# Patient Record
Sex: FEMALE | Race: BLACK OR AFRICAN AMERICAN | NOT HISPANIC OR LATINO | Employment: PART TIME
[De-identification: names, ages, dates, MRNs, and addresses within clinical notes are randomized per-mention and may not be internally consistent; named-entity substitution may affect disease eponyms.]

---

## 2017-05-05 DIAGNOSIS — R52 BODY ACHES: ICD-10-CM

## 2017-05-05 DIAGNOSIS — M72.2 PLANTAR FASCIITIS: ICD-10-CM

## 2017-05-05 RX ORDER — NAPROXEN 500 MG/1
500 TABLET ORAL 2 TIMES DAILY PRN
Qty: 30 TABLET | Refills: 0 | Status: SHIPPED | OUTPATIENT
Start: 2017-05-05 | End: 2022-05-26

## 2017-05-05 NOTE — TELEPHONE ENCOUNTER
naproxen (NAPROSYN) 500 MG tablet     Last Written Prescription Date: 10-6-2016  Last Quantity: 30, # refills: 0  Last Office Visit with G, P or [facility] prescribing provider: 9-       Creatinine   Date Value Ref Range Status   10/06/2016 0.61 0.52 - 1.04 mg/dL Final     Lab Results   Component Value Date    AST 27 10/06/2016     Lab Results   Component Value Date    ALT 35 10/06/2016     BP Readings from Last 3 Encounters:   10/14/16 104/70   10/06/16 116/67   09/28/16 109/78

## 2017-05-05 NOTE — TELEPHONE ENCOUNTER
Prescription approved per [facility], [facility] or [facility] refill protocol.  Kathi Titus RN  [facility] [employer]

## 2017-07-15 ENCOUNTER — HEALTH MAINTENANCE LETTER (OUTPATIENT)
Age: 42
End: 2017-07-15

## 2018-01-23 ENCOUNTER — OFFICE VISIT (OUTPATIENT)
Dept: FAMILY MEDICINE | Facility: CLINIC | Age: 43
End: 2018-01-23
Payer: MEDICAID

## 2018-01-23 VITALS
HEIGHT: 65 IN | TEMPERATURE: 98.4 F | OXYGEN SATURATION: 100 % | WEIGHT: 193 LBS | BODY MASS INDEX: 32.15 KG/M2 | DIASTOLIC BLOOD PRESSURE: 75 MMHG | SYSTOLIC BLOOD PRESSURE: 102 MMHG | HEART RATE: 64 BPM

## 2018-01-23 DIAGNOSIS — H01.004 BLEPHARITIS OF LEFT UPPER EYELID, UNSPECIFIED TYPE: ICD-10-CM

## 2018-01-23 DIAGNOSIS — K21.9 GASTROESOPHAGEAL REFLUX DISEASE, ESOPHAGITIS PRESENCE NOT SPECIFIED: ICD-10-CM

## 2018-01-23 DIAGNOSIS — H10.32 ACUTE CONJUNCTIVITIS OF LEFT EYE, UNSPECIFIED ACUTE CONJUNCTIVITIS TYPE: Primary | ICD-10-CM

## 2018-01-23 DIAGNOSIS — M54.2 CERVICALGIA: ICD-10-CM

## 2018-01-23 PROCEDURE — 99214 OFFICE O/P EST MOD 30 MIN: CPT | Performed by: FAMILY MEDICINE

## 2018-01-23 RX ORDER — TRAMADOL HYDROCHLORIDE 50 MG/1
50 TABLET ORAL 2 TIMES DAILY PRN
Qty: 30 TABLET | Refills: 0 | Status: SHIPPED | OUTPATIENT
Start: 2018-01-23 | End: 2022-05-26

## 2018-01-23 RX ORDER — SULFAMETHOXAZOLE/TRIMETHOPRIM 800-160 MG
1 TABLET ORAL 2 TIMES DAILY
Qty: 20 TABLET | Refills: 0 | Status: SHIPPED | OUTPATIENT
Start: 2018-01-23 | End: 2018-03-21

## 2018-01-23 RX ORDER — SUCRALFATE ORAL 1 G/10ML
1 SUSPENSION ORAL 4 TIMES DAILY
Qty: 420 ML | Refills: 1 | Status: SHIPPED | OUTPATIENT
Start: 2018-01-23 | End: 2022-05-26

## 2018-01-23 NOTE — PROGRESS NOTES
SUBJECTIVE:   Isabel aMthias is a 43 year old female who presents to clinic today for the following health issues:      Eye(s) Problem      Duration: x 3 days     Description:  Location: left  Pain: YES, worse toward inside the eye , so I will it feels like slightly swollen and puffy and red  Swelling:YES,   Redness: YES  Discharge: YES, mattery .  Denies any problem with the vision.  No sinus URI symptoms otherwise    Accompanying signs and symptoms: Watery, itching     History (Trauma, foreign body exposure,): None    Precipitating or alleviating factors (contact use): None    Therapies tried and outcome: None        PROBLEMS TO ADD ON...  She also wants refill on her Ultram Carafate.     Ultram works for her neck, she tries to be cautious with the use.  Pain is aggravated recently somewhat like a refill on Ultram   Also has history of GERD doing well.    Problem list and histories reviewed & adjusted, as indicated.  Additional history: as documented    Patient Active Problem List   Diagnosis     Esophageal reflux     Vitamin D deficiency     Hyperlipidemia LDL goal <130     Advanced directives, counseling/discussion     Pain in joint, ankle and foot     Constipation     H. pylori infection     Sleep-related bruxism     Low back pain     Plantar fasciitis     Anxiety state     Iron deficiency anemia     BMI 34.0-34.9,adult     Past Surgical History:   Procedure Laterality Date     NO HISTORY OF SURGERY         Social History   Substance Use Topics     Smoking status: Never Smoker     Smokeless tobacco: Never Used     Alcohol use No     Family History   Problem Relation Age of Onset     DIABETES Mother      type 2     DIABETES Sister      type 2     DIABETES Maternal Aunt      type 2     Hypertension Mother      Lipids Mother              Reviewed and updated as needed this visit by clinical staff     Reviewed and updated as needed this visit by Provider         ROS:  Constitutional, HEENT, cardiovascular,  "pulmonary, GI, , musculoskeletal, neuro, skin, endocrine and psych systems are negative, except as otherwise noted.      OBJECTIVE:   /75  Pulse 64  Temp 98.4  F (36.9  C) (Tympanic)  Ht 5' 4.8\" (1.646 m)  Wt 193 lb (87.5 kg)  SpO2 100%  BMI 32.32 kg/m2  Body mass index is 32.32 kg/(m^2).  GENERAL: healthy, alert and no distress  EYES: rt Eyes grossly normal to inspection, PERRL, EOMI, left eye, conjunctivae with mild conjunctival injection , her upper eyelid looks slightly puffy and erythematous eyelids.  Minimal discharge at this time  HENT: ear canals and TM's normal, nose and mouth without ulcers or lesions, no sinus tenderness  NECK: no adenopathy  MS, neck with mild  tenderness paracervical muscles , more so on the right with slight decrease ROM of neck .  RESP: lungs clear to auscultation - no rales, rhonchi or wheezes  CV: regular rate and rhythm, normal S1 S2, no S3 or S4,   Abdomen soft nontender            ASSESSMENT/PLAN:           (H10.32) Acute conjunctivitis of left eye, unspecified acute conjunctivitis type  Comment:   Plan: sulfamethoxazole-trimethoprim (BACTRIM         DS/SEPTRA DS) 800-160 MG per tablet            (H01.004) Blepharitis of left upper eyelid, unspecified type  Comment:   Plan: sulfamethoxazole-trimethoprim (BACTRIM         DS/SEPTRA DS) 800-160 MG per tablet        Cares and symptomatic treatment discussed follow up if problem         (K21.9) Gastroesophageal reflux disease, esophagitis presence not specified  (primary encounter diagnosis)  Comment: Stable  Plan: sucralfate (CARAFATE) 1 GM/10ML suspension            (M54.2) Cervicalgia  Comment: Recurrence of symptoms recently  Plan: traMADol (ULTRAM) 50 MG tablet  Refill given.  Neck cares and symptomatic treatment discussed. follow-up as needed    Patient expressed understanding and agreement with treatment plan. All patient's questions were answered, will let me know if has more later.  Medications: Rx's: Reviewed " the potential side effects/complications of medications prescribed.         Daly Cummins MD  [facility] [place]

## 2018-01-23 NOTE — MR AVS SNAPSHOT
"              After Visit Summary   1/23/2018    Isabel Mathias    MRN: [id number]           Patient Information     Date Of Birth          1975        Visit Information        Provider Department      1/23/2018 2:15 PM Daly Cummins MD; [facility] [place]        Today's Diagnoses     Acute conjunctivitis of left eye, unspecified acute conjunctivitis type    -  1    Cervicalgia        Gastroesophageal reflux disease, esophagitis presence not specified        Blepharitis of left upper eyelid, unspecified type          Care Instructions    Take medications as directed.  Treatment  and symptomatic cares discussed   Follow up if problem or concern             Follow-ups after your visit        Follow-up notes from your care team     Return if symptoms worsen or fail to improve.      Who to contact     If you have questions or need follow up information about today's clinic visit or your schedule please contact [facility] [place] directly at [phone].  Normal or non-critical lab and imaging results will be communicated to you by MyChart, letter or phone within 4 business days after the clinic has received the results. If you do not hear from us within 7 days, please contact the clinic through MyChart or phone. If you have a critical or abnormal lab result, we will notify you by phone as soon as possible.  Submit refill requests through [employer] or call your pharmacy and they will forward the refill request to us. Please allow 3 business days for your refill to be completed.          Additional Information About Your Visit        MyChart Information     [employer] lets you send messages to your doctor, view your test results, renew your prescriptions, schedule appointments and more. To sign up, go to [email or web address] . Click on \"Log in\" on the left side of the screen, which will take you to the Welcome page. Then click on \"Sign up Now\" on the right side of the " "page.     You will be asked to enter the access code listed below, as well as some personal information. Please follow the directions to create your username and password.     Your access code is: [email or web address]  Expires: 2018  3:48 PM     Your access code will  in 90 days. If you need help or a new code, please call your [place] clinic or [phone].        Care EveryWhere ID     This is your Care EveryWhere ID. This could be used by other organizations to access your [place] medical records  [id number]        Your Vitals Were     Pulse Temperature Height Pulse Oximetry BMI (Body Mass Index)       64 98.4  F (36.9  C) (Tympanic) 5' 4.8\" (1.646 m) 100% 32.32 kg/m2        Blood Pressure from Last 3 Encounters:   18 102/75   10/14/16 104/70   10/06/16 116/67    Weight from Last 3 Encounters:   18 193 lb (87.5 kg)   10/14/16 192 lb 9.6 oz (87.4 kg)   10/06/16 192 lb (87.1 kg)              Today, you had the following     No orders found for display         Today's Medication Changes          These changes are accurate as of: 18  3:48 PM.  If you have any questions, ask your nurse or doctor.               Start taking these medicines.        Dose/Directions    sulfamethoxazole-trimethoprim 800-160 MG per tablet   Commonly known as:  BACTRIM DS/SEPTRA DS   Used for:  Acute conjunctivitis of left eye, unspecified acute conjunctivitis type, Blepharitis of left upper eyelid, unspecified type   Started by:  Daly Cummins MD        Dose:  1 tablet   Take 1 tablet by mouth 2 times daily   Quantity:  20 tablet   Refills:  0            Where to get your medicines      These medications were sent to [facility] [id number] [facility] - [place] - [address]  [address] [facility] [address]     Phone:  [phone]     sucralfate 1 GM/10ML suspension    sulfamethoxazole-trimethoprim 800-160 MG per tablet         Some of these will need a paper prescription and others can " be bought over the counter.  Ask your nurse if you have questions.     Bring a paper prescription for each of these medications     traMADol 50 MG tablet                Primary Care Provider Office Phone # Fax #    Iraiad Daniels PA-C [phone]       [address] [address]        Equal Access to Services     LEONOR BERNABE : Hadii aad ku hadasho Soomaali, waaxda luqadaha, qaybta kaalmada adeegyada, waxay idiin hayaan adeeg kharash latheresa . So [facility] [phone].    ATENCIÓN: Si habla español, tiene a gonzalez disposición servicios gratuitos de asistencia lingüística. [facility] [phone].    We comply with applicable federal civil rights laws and [place] laws. We do not discriminate on the basis of race, color, national origin, age, disability, sex, sexual orientation, or gender identity.            Thank you!     Thank you for choosing [facility] [place]  for your care. Our goal is always to provide you with excellent care. Hearing back from our patients is one way we can continue to improve our services. Please take a few minutes to complete the written survey that you may receive in the mail after your visit with us. Thank you!             Your Updated Medication List - Protect others around you: Learn how to safely use, store and throw away your medicines at www.disposemymeds.org.          This list is accurate as of: 1/23/18  3:48 PM.  Always use your most recent med list.                   Brand Name Dispense Instructions for use Diagnosis    calcium carbonate 1250 MG tablet    OS-DOLORES 500 mg [place]. [place]    180 tablet    Take 1 tablet (500 mg) by mouth 2 times daily    Vitamin D deficiency       multivitamin, therapeutic with minerals Tabs tablet     100 tablet    Take 1 tablet by mouth daily    Vitamin D deficiency       naproxen 500 MG tablet    NAPROSYN    30 tablet    Take 1 tablet (500 mg) by mouth 2 times daily as needed for moderate pain     Body aches, Plantar fasciitis       order for DME     1 Device    Equipment being ordered: superfeet inserts- size D    Myalgia and myositis, unspecified, Plantar fasciitis       sucralfate 1 GM/10ML suspension    CARAFATE    420 mL    Take 10 mLs (1 g) by mouth 4 times daily    Gastroesophageal reflux disease, esophagitis presence not specified       sulfamethoxazole-trimethoprim 800-160 MG per tablet    BACTRIM DS/SEPTRA DS    20 tablet    Take 1 tablet by mouth 2 times daily    Acute conjunctivitis of left eye, unspecified acute conjunctivitis type, Blepharitis of left upper eyelid, unspecified type       traMADol 50 MG tablet    ULTRAM    30 tablet    Take 1 tablet (50 mg) by mouth 2 times daily as needed for moderate pain    Cervicalgia       * vitamin D 2000 UNITS tablet     100 tablet    Take 2,000 Units by mouth daily    Vitamin D deficiency       * vitamin D 2000 UNITS tablet     100 tablet    Take 2,000 Units by mouth daily    Vitamin D deficiency       * Notice:  This list has 2 medication(s) that are the same as other medications prescribed for you. Read the directions carefully, and ask your doctor or other care provider to review them with you.

## 2018-03-21 ENCOUNTER — OFFICE VISIT (OUTPATIENT)
Dept: FAMILY MEDICINE | Facility: CLINIC | Age: 43
End: 2018-03-21
Payer: MEDICAID

## 2018-03-21 VITALS
RESPIRATION RATE: 20 BRPM | WEIGHT: 194 LBS | HEART RATE: 78 BPM | HEIGHT: 65 IN | BODY MASS INDEX: 32.32 KG/M2 | OXYGEN SATURATION: 99 % | TEMPERATURE: 98.5 F

## 2018-03-21 DIAGNOSIS — K21.9 GASTROESOPHAGEAL REFLUX DISEASE, ESOPHAGITIS PRESENCE NOT SPECIFIED: ICD-10-CM

## 2018-03-21 DIAGNOSIS — M72.2 PLANTAR FASCIITIS: ICD-10-CM

## 2018-03-21 DIAGNOSIS — J30.2 SEASONAL ALLERGIC RHINITIS, UNSPECIFIED CHRONICITY, UNSPECIFIED TRIGGER: ICD-10-CM

## 2018-03-21 DIAGNOSIS — J01.80 OTHER ACUTE SINUSITIS, RECURRENCE NOT SPECIFIED: Primary | ICD-10-CM

## 2018-03-21 PROCEDURE — 99214 OFFICE O/P EST MOD 30 MIN: CPT | Performed by: FAMILY MEDICINE

## 2018-03-21 PROCEDURE — T1013 SIGN LANG/ORAL INTERPRETER: HCPCS | Mod: U3 | Performed by: FAMILY MEDICINE

## 2018-03-21 RX ORDER — AZITHROMYCIN 250 MG/1
TABLET, FILM COATED ORAL
Qty: 6 TABLET | Refills: 0 | Status: SHIPPED | OUTPATIENT
Start: 2018-03-21 | End: 2022-05-26

## 2018-03-21 RX ORDER — FEXOFENADINE HCL 180 MG/1
180 TABLET ORAL DAILY
Qty: 30 TABLET | Refills: 1 | COMMUNITY
Start: 2018-03-21 | End: 2022-05-26

## 2018-03-21 RX ORDER — NABUMETONE 500 MG/1
500-1000 TABLET, FILM COATED ORAL 2 TIMES DAILY PRN
Qty: 30 TABLET | Refills: 0 | Status: SHIPPED | OUTPATIENT
Start: 2018-03-21 | End: 2022-05-26

## 2018-03-21 NOTE — PROGRESS NOTES
SUBJECTIVE:   Isabel Mathias is a 43 year old female who presents to clinic today for the following health issues:        RESPIRATORY SYMPTOMS      Duration: x Saturday     Description  nasal congestion, rhinorrhea, cough, becoming productive , no  Fever but feels achy chills, headache, fatigue/malaise, myalgias,   nausea and appetite decrease     Severity: severe    Accompanying signs and symptoms: chest congestion/tenderness     History (predisposing factors):  none    Precipitating or alleviating factors: None    Therapies tried and outcome:  nyquil not effective . Also has know allergies, not taking anything for that       Medication Followup of  Tramadol, Sucralfate, Naproxen     Taking Medication as prescribed: yes    Side Effects:  None    Medication Helping Symptoms:  Yes         Also has been  treated for planter fascitis with pain med's, it helps but concerned with ongoing issue. Takes naproxen/tramadol mostly to help with her plantar fasciitis.     GERD  Has know hx of GERD, taking sucralfate mostly still feels irritation in her throat sometime, so feels acid in throat.  No abdominal pain or change in bowel movement.  No nausea vomiting  need refill on medications    Problem list and histories reviewed & adjusted, as indicated.    Additional history: as documented    Patient Active Problem List   Diagnosis     Esophageal reflux     Vitamin D deficiency     Hyperlipidemia LDL goal <130     Advanced directives, counseling/discussion     Pain in joint, ankle and foot     Constipation     H. pylori infection     Sleep-related bruxism     Low back pain     Plantar fasciitis     Anxiety state     Iron deficiency anemia     BMI 34.0-34.9,adult     Past Surgical History:   Procedure Laterality Date     NO HISTORY OF SURGERY         Social History   Substance Use Topics     Smoking status: Never Smoker     Smokeless tobacco: Never Used     Alcohol use No     Family History   Problem Relation Age of Onset  "    DIABETES Mother      type 2     DIABETES Sister      type 2     DIABETES Maternal Aunt      type 2     Hypertension Mother      Lipids Mother            Reviewed and updated as needed this visit by clinical staff       Reviewed and updated as needed this visit by Provider         ROS:  Constitutional, HEENT, cardiovascular, pulmonary, GI, , musculoskeletal, neuro, skin, endocrine and psych systems are negative, except as otherwise noted.    OBJECTIVE:     BP (P) 95/64  Pulse 78  Temp 98.5  F (36.9  C) (Tympanic)  Resp 20  Ht 5' 4.8\" (1.646 m)  Wt 194 lb (88 kg)  SpO2 99%  BMI 32.48 kg/m2  Body mass index is 32.48 kg/(m^2).  GENERAL: healthy, alert and no distress  EYES: Eyes grossly normal to inspection, PERRL and conjunctivae and sclerae normal  HENT: normal cephalic/atraumatic, ear canals and TM's normal and oral mucous membranes moist  NECK: no adenopathy, no asymmetry, masses, or scars and thyroid normal to palpation  RESP: lungs clear to auscultation - no rales, rhonchi or wheezes  CV: regular rate and rhythm, normal S1 S2, no S3 or S4, no murmur, click or rub, no peripheral edema and peripheral pulses strong  ABDOMEN: soft, nontender, no hepatosplenomegaly, no masses and bowel sounds normal  NEURO: Normal strength and tone, mentation intact and speech normal  PSYCH: mentation appears normal, affect normal/bright        ASSESSMENT/PLAN:       (J01.80) Other acute sinusitis, recurrence not specified  (primary encounter diagnosis)  Comment:   Plan: azithromycin (ZITHROMAX) 250 MG tablet            (K21.9) Gastroesophageal reflux disease, esophagitis presence not specified  Comment:   Plan: omeprazole (PRILOSEC) 20 MG CR capsule          M72.2) Plantar fasciitis  Comment:   Plan: PODIATRY/FOOT & ANKLE SURGERY REFERRAL,         nabumetone (RELAFEN) 500 MG tablet          Discussed feet cares. Talked about comfortable shoes, orthotics to support etc. Info given on feet. Also gave Relafen instead of " naprosyn bc of her gi concerned., to help with pain in the meantime.        Pros/ cons of med's discussed follow up if ongoing problem. Gave podiatry referral to further evaluate bc of ongoing sx.  she will follow up as needed       (J30.2) Seasonal allergic rhinitis, unspecified chronicity, unspecified trigger  Comment:   Plan: fexofenadine (ALLEGRA) 180 MG tablet            Patient expressed understanding and agreement with treatment plan. All patient's questions were answered, will let me know if has more later.  Medications: Rx's: Reviewed the potential side effects/complications of medications prescribed.       Daly Cummins MD  [facility]

## 2018-03-21 NOTE — NURSING NOTE
"Chief Complaint   Patient presents with     URI       Initial Pulse 78  Temp 98.5  F (36.9  C) (Tympanic)  Resp 20  Ht 5' 4.8\" (1.646 m)  Wt 194 lb (88 kg)  SpO2 99%  BMI 32.48 kg/m2 Estimated body mass index is 32.48 kg/(m^2) as calculated from the following:    Height as of this encounter: 5' 4.8\" (1.646 m).    Weight as of this encounter: 194 lb (88 kg).  Medication Reconciliation: complete  "

## 2018-03-21 NOTE — PATIENT INSTRUCTIONS
"  GERD (Adult)    The esophagus is a tube that carries food from the mouth to the stomach. A valve at the lower end of the esophagus prevents stomach acid from flowing upward. When this valve doesn't work properly, stomach contents may repeatedly flow back up (reflux) into the esophagus. This is called gastroesophageal reflux disease (GERD). GERD can irritate the esophagus. It can cause problems with swallowing or breathing. In severe cases, GERD can cause recurrent pneumonia or other serious problems.  Symptoms of reflux include burning, pressure or sharp pain in the upper abdomen or mid to lower chest. The pain can spread to the neck, back, or shoulder. There may be belching, an acid taste in the back of the throat, chronic cough, or sore throat or hoarseness. GERD symptoms often occur during the day after a big meal. They can also occur at night when lying down.   Home care  Lifestyle changes can help reduce symptoms. If needed, medicines may be prescribed. Symptoms often improve with treatment, but if treatment is stopped, the symptoms often return after a few months. So most persons with GERD will need to continue treatment.  Lifestyle changes    Limit or avoid fatty, fried, and spicy foods, as well as coffee, chocolate, mint, and foods with high acid content such as tomatoes and citrus fruit and juices (orange, grapefruit, lemon).    Don t eat large meals, especially at night. Frequent, smaller meals are best. Do not lie down right after eating. And don t eat anything 3 hours before going to bed.    Avoid drinking alcohol and smoking. As much as possible, stay away from second hand smoke.    If you are overweight, losing weight will reduce symptoms.     Avoid wearing tight clothing around your stomach area.    If your symptoms occur during sleep, use a foam wedge to elevate your upper body (not just your head.) Or, place 4\" blocks under the head of your bed.  Medicines  If needed, medicines can help relieve " the symptoms of GERD and prevent damage to the esophagus. Discuss a medicine plan with your healthcare provider. This may include one or more of the following medicines:    Antacids to help neutralize the normal acids in your stomach.    Acid blockers (H2 blockers) to decrease acid production.    Acid inhibitors (PPIs) to decrease acid production in a different way than the blockers. They may work better, but can take a little longer to take effect.  Take an antacid 30-60 minutes after eating and at bedtime, but not at the same time as an acid blocker.  Try not to take medicines such as ibuprofen and aspirin. If you are taking aspirin for your heart or other medical reasons, talk to your healthcare provider about stopping it.  Follow-up care  Follow up with your healthcare provider or as advised by our staff.  When to seek medical advice  Call your healthcare provider if any of the following occur:    Stomach pain gets worse or moves to the lower right abdomen (appendix area)    Chest pain appears or gets worse, or spreads to the back, neck, shoulder, or arm    Frequent vomiting (can t keep down liquids)    Blood in the stool or vomit (red or black in color)    Feeling weak or dizzy    Fever of 100.4 F (38 C) or higher, or as directed by your healthcare provider  Date Last Reviewed: 6/23/2015 2000-2017 [employer]. [address] [place] [address], [place] [address]. All rights reserved. This information is not intended as a substitute for professional medical care. Always follow your healthcare professional's instructions.

## 2018-03-21 NOTE — MR AVS SNAPSHOT
After Visit Summary   3/21/2018    Isabel Mathias    MRN: [id number]           Patient Information     Date Of Birth          1975        Visit Information        Provider Department      3/21/2018 1:45 PM Daly Cummins MD; [employer] [facility]        Today's Diagnoses     Other acute sinusitis, recurrence not specified    -  1    Gastroesophageal reflux disease, esophagitis presence not specified        Plantar fasciitis        Seasonal allergic rhinitis, unspecified chronicity, unspecified trigger          Care Instructions      GERD (Adult)    The esophagus is a tube that carries food from the mouth to the stomach. A valve at the lower end of the esophagus prevents stomach acid from flowing upward. When this valve doesn't work properly, stomach contents may repeatedly flow back up (reflux) into the esophagus. This is called gastroesophageal reflux disease (GERD). GERD can irritate the esophagus. It can cause problems with swallowing or breathing. In severe cases, GERD can cause recurrent pneumonia or other serious problems.  Symptoms of reflux include burning, pressure or sharp pain in the upper abdomen or mid to lower chest. The pain can spread to the neck, back, or shoulder. There may be belching, an acid taste in the back of the throat, chronic cough, or sore throat or hoarseness. GERD symptoms often occur during the day after a big meal. They can also occur at night when lying down.   Home care  Lifestyle changes can help reduce symptoms. If needed, medicines may be prescribed. Symptoms often improve with treatment, but if treatment is stopped, the symptoms often return after a few months. So most persons with GERD will need to continue treatment.  Lifestyle changes    Limit or avoid fatty, fried, and spicy foods, as well as coffee, chocolate, mint, and foods with high acid content such as tomatoes and citrus fruit and juices (orange,  "grapefruit, lemon).    Don t eat large meals, especially at night. Frequent, smaller meals are best. Do not lie down right after eating. And don t eat anything 3 hours before going to bed.    Avoid drinking alcohol and smoking. As much as possible, stay away from second hand smoke.    If you are overweight, losing weight will reduce symptoms.     Avoid wearing tight clothing around your stomach area.    If your symptoms occur during sleep, use a foam wedge to elevate your upper body (not just your head.) Or, place 4\" blocks under the head of your bed.  Medicines  If needed, medicines can help relieve the symptoms of GERD and prevent damage to the esophagus. Discuss a medicine plan with your healthcare provider. This may include one or more of the following medicines:    Antacids to help neutralize the normal acids in your stomach.    Acid blockers (H2 blockers) to decrease acid production.    Acid inhibitors (PPIs) to decrease acid production in a different way than the blockers. They may work better, but can take a little longer to take effect.  Take an antacid 30-60 minutes after eating and at bedtime, but not at the same time as an acid blocker.  Try not to take medicines such as ibuprofen and aspirin. If you are taking aspirin for your heart or other medical reasons, talk to your healthcare provider about stopping it.  Follow-up care  Follow up with your healthcare provider or as advised by our staff.  When to seek medical advice  Call your healthcare provider if any of the following occur:    Stomach pain gets worse or moves to the lower right abdomen (appendix area)    Chest pain appears or gets worse, or spreads to the back, neck, shoulder, or arm    Frequent vomiting (can t keep down liquids)    Blood in the stool or vomit (red or black in color)    Feeling weak or dizzy    Fever of 100.4 F (38 C) or higher, or as directed by your healthcare provider  Date Last Reviewed: 6/23/2015 2000-2017 [employer]. [address] [place] [address]. All rights reserved. This information is not intended as a substitute for professional medical care. Always follow your healthcare professional's instructions.                Follow-ups after your visit        Additional Services     PODIATRY/FOOT & ANKLE SURGERY REFERRAL       Your provider has referred you to: [place]: [facility] [phone]   [email or web address]    Please be aware that coverage of these services is subject to the terms and limitations of your health insurance plan.  Call member services at your health plan with any benefit or coverage questions.      Please bring the following to your appointment:  >>   Any x-rays, CTs or MRIs which have been performed.  Contact the facility where they were done to arrange for  prior to your scheduled appointment.    >>   List of current medications   >>   This referral request   >>   Any documents/labs given to you for this referral                  Follow-up notes from your care team     Return in about 8 weeks (around 5/16/2018), or if symptoms worsen or fail to improve.      Who to contact     If you have questions or need follow up information about today's clinic visit or your schedule please contact [facility] [place] directly at [phone].  Normal or non-critical lab and imaging results will be communicated to you by MyChart, letter or phone within 4 business days after the clinic has received the results. If you do not hear from us within 7 days, please contact the clinic through MyChart or phone. If you have a critical or abnormal lab result, we will notify you by phone as soon as possible.  Submit refill requests through [employer] or call your pharmacy and they will forward the refill request to us. Please allow 3 business days for your refill to be completed.          Additional Information About Your Visit        MyChart Information      "[employer] lets you send messages to your doctor, view your test results, renew your prescriptions, schedule appointments and more. To sign up, go to [email or web address] . Click on \"Log in\" on the left side of the screen, which will take you to the Welcome page. Then click on \"Sign up Now\" on the right side of the page.     You will be asked to enter the access code listed below, as well as some personal information. Please follow the directions to create your username and password.     Your access code is: [email or web address]  Expires: 2018  4:48 PM     Your access code will  in 90 days. If you need help or a new code, please call your [place] clinic or [phone].        Care EveryWhere ID     This is your Care EveryWhere ID. This could be used by other organizations to access your [place] medical records  [id number]        Your Vitals Were     Pulse Temperature Respirations Height Pulse Oximetry BMI (Body Mass Index)    78 98.5  F (36.9  C) (Tympanic) 20 5' 4.8\" (1.646 m) 99% 32.48 kg/m2       Blood Pressure from Last 3 Encounters:   18 (P) 95/64   18 102/75   10/14/16 104/70    Weight from Last 3 Encounters:   18 194 lb (88 kg)   18 193 lb (87.5 kg)   10/14/16 192 lb 9.6 oz (87.4 kg)              We Performed the Following     PODIATRY/FOOT & ANKLE SURGERY REFERRAL          Today's Medication Changes          These changes are accurate as of 3/21/18  2:43 PM.  If you have any questions, ask your nurse or doctor.               Start taking these medicines.        Dose/Directions    azithromycin 250 MG tablet   Commonly known as:  ZITHROMAX   Used for:  Other acute sinusitis, recurrence not specified   Started by:  Daly Cummins MD        Two tablets first day, then one tablet daily for four days.   Quantity:  6 tablet   Refills:  0       fexofenadine 180 MG tablet   Commonly known as:  ALLEGRA   Used for:  Seasonal allergic rhinitis, unspecified chronicity, unspecified " trigger   Started by:  Daly Cummins MD        Dose:  180 mg   Take 1 tablet (180 mg) by mouth daily   Quantity:  30 tablet   Refills:  1       nabumetone 500 MG tablet   Commonly known as:  RELAFEN   Used for:  Plantar fasciitis   Started by:  Daly Cummins MD        Dose:  500-1000 mg   Take 1-2 tablets (500-1,000 mg) by mouth 2 times daily as needed for moderate pain   Quantity:  30 tablet   Refills:  0       omeprazole 20 MG CR capsule   Commonly known as:  priLOSEC   Used for:  Gastroesophageal reflux disease, esophagitis presence not specified   Started by:  Daly Cummins MD        Dose:  20 mg   Take 1 capsule (20 mg) by mouth daily   Quantity:  90 capsule   Refills:  1            Where to get your medicines      These medications were sent to [place] [address] [place] [facility] [address]  [address] [facility] [address]     Phone:  [phone]     azithromycin 250 MG tablet    nabumetone 500 MG tablet    omeprazole 20 MG CR capsule         Some of these will need a paper prescription and others can be bought over the counter.  Ask your nurse if you have questions.     You don't need a prescription for these medications     fexofenadine 180 MG tablet                Primary Care Provider Office Phone # Fax #    [facility] [phone]       [address] [facility] [address]        Equal Access to Services     [facility]: Hadii rosio chester hadasho Sobk, waaxda luqadaha, qaybta kaalmada adeegyada, luis hanson. So [facility] [phone].    ATENCIÓN: Si habla español, tiene a gonzalez disposición servicios gratuitos de asistencia lingüística. Llame al [phone].    We comply with applicable federal civil rights laws and [place] laws. We do not discriminate on the basis of race, color, national origin, age, disability, sex, sexual orientation, or gender identity.            Thank you!      Thank you for choosing [facility] [place]  for your care. Our goal is always to provide you with excellent care. Hearing back from our patients is one way we can continue to improve our services. Please take a few minutes to complete the written survey that you may receive in the mail after your visit with us. Thank you!             Your Updated Medication List - Protect others around you: Learn how to safely use, store and throw away your medicines at www.disposemymeds.org.          This list is accurate as of 3/21/18  2:43 PM.  Always use your most recent med list.                   Brand Name Dispense Instructions for use Diagnosis    azithromycin 250 MG tablet    ZITHROMAX    6 tablet    Two tablets first day, then one tablet daily for four days.    Other acute sinusitis, recurrence not specified       calcium carbonate 1250 MG tablet    OS-DOLORES 500 mg [place]    180 tablet    Take 1 tablet (500 mg) by mouth 2 times daily    Vitamin D deficiency       fexofenadine 180 MG tablet    ALLEGRA    30 tablet    Take 1 tablet (180 mg) by mouth daily    Seasonal allergic rhinitis, unspecified chronicity, unspecified trigger       multivitamin, therapeutic with minerals Tabs tablet     100 tablet    Take 1 tablet by mouth daily    Vitamin D deficiency       nabumetone 500 MG tablet    RELAFEN    30 tablet    Take 1-2 tablets (500-1,000 mg) by mouth 2 times daily as needed for moderate pain    Plantar fasciitis       naproxen 500 MG tablet    NAPROSYN    30 tablet    Take 1 tablet (500 mg) by mouth 2 times daily as needed for moderate pain    Body aches, Plantar fasciitis       omeprazole 20 MG CR capsule    priLOSEC    90 capsule    Take 1 capsule (20 mg) by mouth daily    Gastroesophageal reflux disease, esophagitis presence not specified       order for DME     1 Device    Equipment being ordered: superfeet inserts- size D    Myalgia and myositis, unspecified, Plantar fasciitis       sucralfate 1 GM/10ML  suspension    CARAFATE    420 mL    Take 10 mLs (1 g) by mouth 4 times daily    Gastroesophageal reflux disease, esophagitis presence not specified       traMADol 50 MG tablet    ULTRAM    30 tablet    Take 1 tablet (50 mg) by mouth 2 times daily as needed for moderate pain    Cervicalgia       * vitamin D 2000 UNITS tablet     100 tablet    Take 2,000 Units by mouth daily    Vitamin D deficiency       * vitamin D 2000 UNITS tablet     100 tablet    Take 2,000 Units by mouth daily    Vitamin D deficiency       * Notice:  This list has 2 medication(s) that are the same as other medications prescribed for you. Read the directions carefully, and ask your doctor or other care provider to review them with you.

## 2018-03-27 ENCOUNTER — OFFICE VISIT (OUTPATIENT)
Dept: PODIATRY | Facility: CLINIC | Age: 43
End: 2018-03-27
Attending: FAMILY MEDICINE
Payer: MEDICAID

## 2018-03-27 VITALS — WEIGHT: 194 LBS | HEART RATE: 88 BPM | BODY MASS INDEX: 32.32 KG/M2 | HEIGHT: 65 IN

## 2018-03-27 DIAGNOSIS — M72.2 PLANTAR FASCIITIS: Primary | ICD-10-CM

## 2018-03-27 PROCEDURE — 99243 OFF/OP CNSLTJ NEW/EST LOW 30: CPT | Performed by: PODIATRIST

## 2018-03-27 PROCEDURE — T1013 SIGN LANG/ORAL INTERPRETER: HCPCS | Mod: U3 | Performed by: PODIATRIST

## 2018-03-27 ASSESSMENT — PAIN SCALES - GENERAL: PAINLEVEL: SEVERE PAIN (6)

## 2018-03-27 NOTE — LETTER
3/27/2018         RE: Isabel Mathias  [address]  [place] [address]        Dear Colleague,    Thank you for referring your patient, Isabel Mathias, to the [facility]. Please see a copy of my visit note below.    PATIENT HISTORY:  Isabel Mathias is a 43 year old female who presents to clinic for b/l plantar heel pain.  Worse after rest.  She had injection 2 years ago on the right.  Pain has been present for years.  No other treatments reported.  Nothing seems to help.  -7/10 pain.    I was requested to see this patient for this issue by Dr Cummins.    Review of Systems:  Patient denies fever, chills, rash, wound, stiffness, limping, numbness, weakness, heart burn, blood in stool, chest pain with activity, calf pain when walking, shortness of breath with activity, chronic cough, easy bleeding/bruising, swelling of ankles, excessive thirst, fatigue, depression, anxiety.      PAST MEDICAL HISTORY:   Past Medical History:   Diagnosis Date     Esophageal reflux      Iron deficiency anemia 2013     Menopause      Mixed hyperlipidemia     elevated LDL     Presence of intrauterine contraceptive device -    out 2007     Sleep-related bruxism 10/3/2014     Supervision of other normal pregnancy      - vaginal     Vitamin D deficiency 2009    vit D2/D3 of 19        PAST SURGICAL HISTORY:   Past Surgical History:   Procedure Laterality Date     NO HISTORY OF SURGERY          MEDICATIONS:   Current Outpatient Prescriptions:      omeprazole (PRILOSEC) 20 MG CR capsule, Take 1 capsule (20 mg) by mouth daily, Disp: 90 capsule, Rfl: 1     nabumetone (RELAFEN) 500 MG tablet, Take 1-2 tablets (500-1,000 mg) by mouth 2 times daily as needed for moderate pain, Disp: 30 tablet, Rfl: 0     azithromycin (ZITHROMAX) 250 MG tablet, Two tablets first day, then one tablet daily for four days., Disp: 6 tablet, Rfl: 0     fexofenadine (ALLEGRA) 180 MG tablet, Take 1 tablet  "(180 mg) by mouth daily, Disp: 30 tablet, Rfl: 1     sucralfate (CARAFATE) 1 GM/10ML suspension, Take 10 mLs (1 g) by mouth 4 times daily, Disp: 420 mL, Rfl: 1     traMADol (ULTRAM) 50 MG tablet, Take 1 tablet (50 mg) by mouth 2 times daily as needed for moderate pain, Disp: 30 tablet, Rfl: 0     naproxen (NAPROSYN) 500 MG tablet, Take 1 tablet (500 mg) by mouth 2 times daily as needed for moderate pain, Disp: 30 tablet, Rfl: 0     calcium carbonate (OS-DOLORES 500 MG [place]. [place]) 500 MG tablet, Take 1 tablet (500 mg) by mouth 2 times daily, Disp: 180 tablet, Rfl: 3     Cholecalciferol (VITAMIN D) 2000 UNITS tablet, Take 2,000 Units by mouth daily, Disp: 100 tablet, Rfl: 3     multivitamin, therapeutic with minerals (MULTI-VITAMIN) TABS, Take 1 tablet by mouth daily, Disp: 100 tablet, Rfl: 3     Cholecalciferol (VITAMIN D) 2000 UNITS tablet, Take 2,000 Units by mouth daily, Disp: 100 tablet, Rfl: 3     ORDER FOR DME, Equipment being ordered: superfeet inserts- size D, Disp: 1 Device, Rfl: 0     ALLERGIES:    Allergies   Allergen Reactions     No Known Drug Allergies         SOCIAL HISTORY:   Social History     Social History     Marital status:      Spouse name: N/A     Number of children: N/A     Years of education: N/A     Occupational History     [employer]     Social History Main Topics     Smoking status: Never Smoker     Smokeless tobacco: Never Used     Alcohol use No     Drug use: No     Sexual activity: Yes     Partners: Male     Other Topics Concern     Not on file     Social History Narrative    Lives with , six children ages .  Not working , non smoker, no alcohol         FAMILY HISTORY:   Family History   Problem Relation Age of Onset     DIABETES Mother      type 2     DIABETES Sister      type 2     DIABETES Maternal Aunt      type 2     Hypertension Mother      Lipids Mother         EXAM:Vitals: Pulse 88  Ht 5' 4.8\" (1.646 m)  Wt 194 lb (88 kg)  BMI 32.48 kg/m2  BMI= Body " mass index is 32.48 kg/(m^2).    General appearance: Patient is alert and fully cooperative with history & exam.  No sign of distress is noted during the visit.     Psychiatric: Affect is pleasant & appropriate.  Patient appears motivated to improve health.     Respiratory: Breathing is regular & unlabored while sitting.     HEENT: Hearing is intact to spoken word.  Speech is clear.  No gross evidence of visual impairment that would impact ambulation.     Dermatologic: Skin is intact to both lower extremities without significant lesions, rash or abrasion.  No paronychia or evidence of soft tissue infection is noted.     Vascular: DP & PT pulses are intact & regular bilaterally.  No significant edema or varicosities noted.  CFT and skin temperature are normal to both lower extremities.     Neurologic: Lower extremity sensation is intact to light touch.  No evidence of weakness or contracture in the lower extremities.  No evidence of neuropathy.     Musculoskeletal: b/l plantar heel pain with palpation and plantar fascial insertions.  No pain with side to side heel squeeze.  Patient is ambulatory without assistive device or brace.  No gross ankle deformity noted.  No foot or ankle joint effusion is noted.     ASSESSMENT: b/l plantar fasciitis     PLAN:  Reviewed patient's chart in epic.  Discussed condition and treatment options including pros and cons.    The potential causes and nature of plantar fasciitis were discussed with the patient.  We reviewed the natural history/prognosis of the condition and risks if left untreated.      We discussed possible causes of the condition as it relates to the patients specific situation.      Conservative treatment options were reviewed:  appropriate shoes, avoidance of barefoot walking, inserts/orthoses, stretching, ice, massage, immobilization and NSAIDs.     We also reviewed the option of injection therapy.     After thorough discussion and answering all questions, the  patient elected to try icing, stretching, superfeet inserts.  F/u 1-2 months prn.          Tima Mills DPM, FACFAS      Weight management plan: Patient was referred to their PCP to discuss a diet and exercise plan.        Again, thank you for allowing me to participate in the care of your patient.        Sincerely,        Tima Mills DPM

## 2018-03-27 NOTE — PROGRESS NOTES
PATIENT HISTORY:  Isabel Mathias is a 43 year old female who presents to clinic for b/l plantar heel pain.  Worse after rest.  She had injection 2 years ago on the right.  Pain has been present for years.  No other treatments reported.  Nothing seems to help.  6-7/10 pain.    I was requested to see this patient for this issue by Dr Cummins.    Review of Systems:  Patient denies fever, chills, rash, wound, stiffness, limping, numbness, weakness, heart burn, blood in stool, chest pain with activity, calf pain when walking, shortness of breath with activity, chronic cough, easy bleeding/bruising, swelling of ankles, excessive thirst, fatigue, depression, anxiety.      PAST MEDICAL HISTORY:   Past Medical History:   Diagnosis Date     Esophageal reflux      Iron deficiency anemia 2013     Menopause      Mixed hyperlipidemia     elevated LDL     Presence of intrauterine contraceptive device -    out      Sleep-related bruxism 10/3/2014     Supervision of other normal pregnancy      - vaginal     Vitamin D deficiency 2009    vit D2/D3 of 19        PAST SURGICAL HISTORY:   Past Surgical History:   Procedure Laterality Date     NO HISTORY OF SURGERY          MEDICATIONS:   Current Outpatient Prescriptions:      omeprazole (PRILOSEC) 20 MG CR capsule, Take 1 capsule (20 mg) by mouth daily, Disp: 90 capsule, Rfl: 1     nabumetone (RELAFEN) 500 MG tablet, Take 1-2 tablets (500-1,000 mg) by mouth 2 times daily as needed for moderate pain, Disp: 30 tablet, Rfl: 0     azithromycin (ZITHROMAX) 250 MG tablet, Two tablets first day, then one tablet daily for four days., Disp: 6 tablet, Rfl: 0     fexofenadine (ALLEGRA) 180 MG tablet, Take 1 tablet (180 mg) by mouth daily, Disp: 30 tablet, Rfl: 1     sucralfate (CARAFATE) 1 GM/10ML suspension, Take 10 mLs (1 g) by mouth 4 times daily, Disp: 420 mL, Rfl: 1     traMADol (ULTRAM) 50 MG tablet, Take 1 tablet (50 mg) by mouth 2 times daily as needed for  "moderate pain, Disp: 30 tablet, Rfl: 0     naproxen (NAPROSYN) 500 MG tablet, Take 1 tablet (500 mg) by mouth 2 times daily as needed for moderate pain, Disp: 30 tablet, Rfl: 0     calcium carbonate (OS-DOLORES 500 MG [place]. [place]) 500 MG tablet, Take 1 tablet (500 mg) by mouth 2 times daily, Disp: 180 tablet, Rfl: 3     Cholecalciferol (VITAMIN D) 2000 UNITS tablet, Take 2,000 Units by mouth daily, Disp: 100 tablet, Rfl: 3     multivitamin, therapeutic with minerals (MULTI-VITAMIN) TABS, Take 1 tablet by mouth daily, Disp: 100 tablet, Rfl: 3     Cholecalciferol (VITAMIN D) 2000 UNITS tablet, Take 2,000 Units by mouth daily, Disp: 100 tablet, Rfl: 3     ORDER FOR DME, Equipment being ordered: superfeet inserts- size D, Disp: 1 Device, Rfl: 0     ALLERGIES:    Allergies   Allergen Reactions     No Known Drug Allergies         SOCIAL HISTORY:   Social History     Social History     Marital status:      Spouse name: N/A     Number of children: N/A     Years of education: N/A     Occupational History     [employer]     Social History Main Topics     Smoking status: Never Smoker     Smokeless tobacco: Never Used     Alcohol use No     Drug use: No     Sexual activity: Yes     Partners: Male     Other Topics Concern     Not on file     Social History Narrative    Lives with , six children ages .  Not working , non smoker, no alcohol         FAMILY HISTORY:   Family History   Problem Relation Age of Onset     DIABETES Mother      type 2     DIABETES Sister      type 2     DIABETES Maternal Aunt      type 2     Hypertension Mother      Lipids Mother         EXAM:Vitals: Pulse 88  Ht 5' 4.8\" (1.646 m)  Wt 194 lb (88 kg)  BMI 32.48 kg/m2  BMI= Body mass index is 32.48 kg/(m^2).    General appearance: Patient is alert and fully cooperative with history & exam.  No sign of distress is noted during the visit.     Psychiatric: Affect is pleasant & appropriate.  Patient appears motivated to improve " health.     Respiratory: Breathing is regular & unlabored while sitting.     HEENT: Hearing is intact to spoken word.  Speech is clear.  No gross evidence of visual impairment that would impact ambulation.     Dermatologic: Skin is intact to both lower extremities without significant lesions, rash or abrasion.  No paronychia or evidence of soft tissue infection is noted.     Vascular: DP & PT pulses are intact & regular bilaterally.  No significant edema or varicosities noted.  CFT and skin temperature are normal to both lower extremities.     Neurologic: Lower extremity sensation is intact to light touch.  No evidence of weakness or contracture in the lower extremities.  No evidence of neuropathy.     Musculoskeletal: b/l plantar heel pain with palpation and plantar fascial insertions.  No pain with side to side heel squeeze.  Patient is ambulatory without assistive device or brace.  No gross ankle deformity noted.  No foot or ankle joint effusion is noted.     ASSESSMENT: b/l plantar fasciitis     PLAN:  Reviewed patient's chart in epic.  Discussed condition and treatment options including pros and cons.    The potential causes and nature of plantar fasciitis were discussed with the patient.  We reviewed the natural history/prognosis of the condition and risks if left untreated.      We discussed possible causes of the condition as it relates to the patients specific situation.      Conservative treatment options were reviewed:  appropriate shoes, avoidance of barefoot walking, inserts/orthoses, stretching, ice, massage, immobilization and NSAIDs.     We also reviewed the option of injection therapy.     After thorough discussion and answering all questions, the patient elected to try icing, stretching, superfeet inserts.  F/u 1-2 months prn.          Tima Mills DPM, FACFAS      Weight management plan: Patient was referred to their PCP to discuss a diet and exercise plan.

## 2018-03-27 NOTE — NURSING NOTE
"Chief Complaint   Patient presents with     Establish Care     Foot Pain     bilateral x several yrs / no injury       Initial Pulse 88  Ht 5' 4.8\" (1.646 m)  Wt 194 lb (88 kg)  BMI 32.48 kg/m2 Estimated body mass index is 32.48 kg/(m^2) as calculated from the following:    Height as of this encounter: 5' 4.8\" (1.646 m).    Weight as of this encounter: 194 lb (88 kg).  Medication Reconciliation: complete    "

## 2018-03-27 NOTE — MR AVS SNAPSHOT
After Visit Summary   3/27/2018    Isabel Mathias    MRN: [id number]           Patient Information     Date Of Birth          1975        Visit Information        Provider Department      3/27/2018 1:15 PM Tima Mills DPM; [employer] [facility]        Today's Diagnoses     Plantar fasciitis    -  1      Care Instructions      Dr Mills Clinic Locations        Mondays Tuesdays   [facility]   [address] [address]   [place] [address] [place] [address]   ph: [phone] ph: [phone]   fax: [phone] fax: [phone]       Wednesdays Thursdays - Surgery   [facility] [place] [facility] Scheduling - [place]: [phone]   [address] [place] [address] To Schedule an appointment please call:   ph: [phone]   fax: [phone]      Follow up as needed    Over the Counter Inserts    Super Feet are the most common and easiest to find.    Locations include any [employer], [employer] in [place] on [address] and in [place] on [address], [facility] in [place] on [address], [facility] Running Room in [place] on [address], [employer] in [place] on [address] and [employer] in [facility] [place] on [place] and in [place] on [address].      PLANTAR FASCIITIS  Plantar fasciitis is often referred to as heel spurs or heel pain. Plantar fasciitis is a very common problem that affects people of all foot shapes, age, weight and activity level. Pain may be in the arch or on the weight-bearing surface of the heel. The pain may come on without injury or identifiable cause. Pain is generally present when first getting out of bed in the morning or up from a seated break.     CAUSES  The plantar fascia is a dense fibrous band of tissue that stretches across the bottom  surface of the foot. The fascia helps support the foot muscles and arch. Plantar fasciitis is thought to be caused by mechanical strain or overload. Frequent walking without shoes or wearing unsupportive shoes is thought to cause structural overload and ultimately inflammation of the plantar fascia. Some people have heel spurs that can be seen on x-ray. The heel spur is actually a minor component of plantar fascitis and is largely ignored.       SELF TREATMENT   The easiest solution is to stop walking around your home without shoes. Plantar fasciitis is largely a shoe problem. Shoes are either not being worn often enough or your current shoes are inadequate for your weight, foot structure or activity level. The majority of shoes on the market today are not sufficient to resist development of plantar fasciitis or to promote healing. Assume that your current shoes are inadequate and will need to be replaced. Even high quality shoes wear out with 6 months to one year of frequent use. Weight loss is another option. Losing ten pounds in the next two months may be enough to resolve the problem. Ice applied to the area of pain two to three times per day for ten minutes each session can be very helpful. Warm foot soaks in epsom salts can also relieve pain. This should continue until the problem resolves. Achilles tendon stretching is essential. Stretch multiple times daily to promote healing and to prevent recurrence in the future. Over all stretching of the body is helpful as well such as the calves, thighs and lower back. Normally when one area of the body is tight, other areas are too. Gentle Yoga can be good for this.     Over the counter topical anti inflammatories can be helpful such as biofreeze, bengay, salon pas, ect...  Oral ibuprofen or aleve is recommended as well to try to calm down inflammation.     Night splints can be helpful to gradually stretch the foot at night as a lot of pain is when you get up in the  morning. Taking a towel or thera band and stretching the foot back multiple times before you get ou of bed can be beneficial as well.     MEDICAL TREATMENT  Medical treatments often include custom arch supports, cortisone injections, physical therapy, splints to be worn in bed, prescription medications and surgery. The home treatments listed above will be necessary regardless of these advanced medical treatments. Surgery is rarely needed but is very helpful in selected cases.     PROGNOSIS  Plantar fasciitis can last from one day to a lifetime. Some people get intermittent fascitis that is very short-lived. Others suffer daily for years. Excessive body weight, frequent bare foot walking, long hours on the feet, inadequate shoes, predisposing foot structures and excessive activity such as running are all potential issues that lead to chronic and/or recurring plantar fascitis. Having plantar fasciitis means that you are forever prone to this problem and will require modification of some of the above factors. Most people seek treatment within one to four months. Healing usually requires a similar one to four month time frame. Healing time is relative to the amount of effort spent treating the problem.   Plantar fasciitis is highly recurrent. Risk factors often continue, including return to bare foot walking, inadequate shoes, excessive body weight, excessive activities, etc. Your life style and foot structure may predispose you to recurrent plantar fasciitis. A daily prevention regimen can be very helpful. Ongoing use of shoe inserts, careful attention to appropriate shoes, daily Achilles stretching, etc. may prevent recurrence. Prompt attention at the earliest warning signs of heel pain can resolve the problem in as short as a few days.     EXERCISES  Stair Exercise: Step on the stairs with the ball of your foot and hold your position for at least 15 seconds, then slowly step down with the heels of your foot. You can  do this daily and as often as you want.   Picking the Towel: Sit comfortably and then pick the towel up with your toes. You can use any object other than a towel as long as the material can be soft and you can pick it up with your toes.  Rolling the Bottle: Use a small ball or frozen water bottle and then roll it around with your foot.   Flex the Toes: Sit comfortably and then flex your toes by pointing it towards the floor or towards your body. This will relax and flex your foot and exercise your plantar fascia, the calf, and the Achilles tendon. The inability of the foot to stretch often causes the bunching up of the plantar fascia area leading to the pain.  Calf/Achilles Stretching: Lay on you back and raise one foot, then point your toes towards the floor. See photo below:               Hold each stretch for 10 seconds. Stretch 10 times per set, three sets per day. Morning, afternoon and evening. If your heel pain is very severe in the morning, consider doing the first set of stretches before you get out of bed.      OVER THE COUNTER INSERT RECOMMENDATIONS  SuperFeet   Sofsole Fit Spenco   Power Step   Walk-Fit Arch Cradles     Most of these can be found at your local [employer], sporting [employer], or online.  **A good high quality over the counter insert should cost around $40-$50      [place] [address]  [phone]   [address]  [phone] [place]  [address] [place] [address]  [phone]   [place]  [address]  [phone]   [place]  [address] [facility]  [phone] [place]  [address] [place] [address]  [phone]   [place]  [address]  [phone]   [place]  [address] [place] [address]  [phone] [place]  [address] [phone]           Body Mass Index (BMI)  Many things can cause foot and ankle problems. Foot structure, activity level, foot mechanics and injuries are common causes of pain.  One  "very important issue that often goes unmentioned is body weight. Extra weight can cause increased stress on muscles, ligaments, bones and tendons. Sometimes just a few extra pounds is all it takes to put one over her/his threshold. Without reducing that stress, it can be difficult to alleviate pain.   Some people are uncomfortable addressing this issue, but we feel it is important for you to think about it. As Foot & Ankle specialists, our job is addressing the lower extremity problem and possible causes.   Regarding extra body weight, we encourage patients to discuss diet and weight management plans with their primary care doctors. It is this team approach that gives you the best opportunity for pain relief and getting you back on your feet.               Follow-ups after your visit        Follow-up notes from your care team     Return if symptoms worsen or fail to improve.      Who to contact     If you have questions or need follow up information about today's clinic visit or your schedule please contact [facility] directly at [phone].  Normal or non-critical lab and imaging results will be communicated to you by [employer], letter or phone within 4 business days after the clinic has received the results. If you do not hear from us within 7 days, please contact the clinic through [employer] or phone. If you have a critical or abnormal lab result, we will notify you by phone as soon as possible.  Submit refill requests through [employer] or call your pharmacy and they will forward the refill request to us. Please allow 3 business days for your refill to be completed.          Additional Information About Your Visit        [employer] Information     [employer] lets you send messages to your doctor, view your test results, renew your prescriptions, schedule appointments and more. To sign up, go to [email or web address] . Click on \"Log in\" on the left side of the screen, which will take you to the Welcome page. " "Then click on \"Sign up Now\" on the right side of the page.     You will be asked to enter the access code listed below, as well as some personal information. Please follow the directions to create your username and password.     Your access code is: [email or web address]  Expires: 2018  4:48 PM     Your access code will  in 90 days. If you need help or a new code, please call your [facility] clinic or [phone].        Care EveryWhere ID     This is your Care EveryWhere ID. This could be used by other organizations to access your [facility] medical records  [id number]        Your Vitals Were     Pulse Height BMI (Body Mass Index)             88 5' 4.8\" (1.646 m) 32.48 kg/m2          Blood Pressure from Last 3 Encounters:   18 (P) 95/64   18 102/75   10/14/16 104/70    Weight from Last 3 Encounters:   18 194 lb (88 kg)   18 194 lb (88 kg)   18 193 lb (87.5 kg)              Today, you had the following     No orders found for display       Primary Care Provider Office Phone # Fax #    [facility] [phone]       [address] [facility] [address]        Equal Access to Services     LEONOR BERNABE : Hadii aad ku hadasho Sojavierali, waaxda luqadaha, qaybta kaalmada adeegyada, luis browning . So [facility] [phone].    ATENCIÓN: Si habla español, tiene a gonzalez disposición servicios gratuitos de asistencia lingüística. Llame al [phone].    We comply with applicable federal civil rights laws and [place] laws. We do not discriminate on the basis of race, color, national origin, age, disability, sex, sexual orientation, or gender identity.            Thank you!     Thank you for choosing [facility]  for your care. Our goal is always to provide you with excellent care. Hearing back from our patients is one way we can continue to improve our services. Please take a few minutes to complete the written survey that " you may receive in the mail after your visit with us. Thank you!             Your Updated Medication List - Protect others around you: Learn how to safely use, store and throw away your medicines at www.disposemymeds.org.          This list is accurate as of 3/27/18  1:52 PM.  Always use your most recent med list.                   Brand Name Dispense Instructions for use Diagnosis    azithromycin 250 MG tablet    ZITHROMAX    6 tablet    Two tablets first day, then one tablet daily for four days.    Other acute sinusitis, recurrence not specified       calcium carbonate 1250 MG tablet    OS-DOLORES 500 mg [place]. Ca    180 tablet    Take 1 tablet (500 mg) by mouth 2 times daily    Vitamin D deficiency       fexofenadine 180 MG tablet    ALLEGRA    30 tablet    Take 1 tablet (180 mg) by mouth daily    Seasonal allergic rhinitis, unspecified chronicity, unspecified trigger       multivitamin, therapeutic with minerals Tabs tablet     100 tablet    Take 1 tablet by mouth daily    Vitamin D deficiency       nabumetone 500 MG tablet    RELAFEN    30 tablet    Take 1-2 tablets (500-1,000 mg) by mouth 2 times daily as needed for moderate pain    Plantar fasciitis       naproxen 500 MG tablet    NAPROSYN    30 tablet    Take 1 tablet (500 mg) by mouth 2 times daily as needed for moderate pain    Body aches, Plantar fasciitis       omeprazole 20 MG CR capsule    priLOSEC    90 capsule    Take 1 capsule (20 mg) by mouth daily    Gastroesophageal reflux disease, esophagitis presence not specified       order for DME     1 Device    Equipment being ordered: superfeet inserts- size D    Myalgia and myositis, unspecified, Plantar fasciitis       sucralfate 1 GM/10ML suspension    CARAFATE    420 mL    Take 10 mLs (1 g) by mouth 4 times daily    Gastroesophageal reflux disease, esophagitis presence not specified       traMADol 50 MG tablet    ULTRAM    30 tablet    Take 1 tablet (50 mg) by mouth 2 times daily as needed for moderate  pain    Cervicalgia       * vitamin D 2000 UNITS tablet     100 tablet    Take 2,000 Units by mouth daily    Vitamin D deficiency       * vitamin D 2000 UNITS tablet     100 tablet    Take 2,000 Units by mouth daily    Vitamin D deficiency       * Notice:  This list has 2 medication(s) that are the same as other medications prescribed for you. Read the directions carefully, and ask your doctor or other care provider to review them with you.

## 2018-03-27 NOTE — PATIENT INSTRUCTIONS
[facility] Locations        Mondays Tuesdays   [facility]   [address] [facility] [address]   [place] [address] [place] [address]   ph: [phone] ph: [phone]   fax: [phone] fax: [phone]       Wednesdays Thursdays - Surgery   [facility] Scheduling - [place]: [phone]   [address] [place] [address] To Schedule an appointment please call:   ph: [phone]   fax: [phone]      Follow up as needed    Over the Counter Inserts    Super Feet are the most common and easiest to find.    Locations include any [employer], [employer] in [place] on [address] and in [place] on [address], [facility] in [facility] on [address], [facility] in [place] on [address], [employer] in [place] on [address] and [employer] in [facility] on [place] and in [place] on [address].      PLANTAR FASCIITIS  Plantar fasciitis is often referred to as heel spurs or heel pain. Plantar fasciitis is a very common problem that affects people of all foot shapes, age, weight and activity level. Pain may be in the arch or on the weight-bearing surface of the heel. The pain may come on without injury or identifiable cause. Pain is generally present when first getting out of bed in the morning or up from a seated break.     CAUSES  The plantar fascia is a dense fibrous band of tissue that stretches across the bottom surface of the foot. The fascia helps support the foot muscles and arch. Plantar fasciitis is thought to be caused by mechanical strain or overload. Frequent walking without shoes or wearing unsupportive shoes is thought to cause structural overload and ultimately inflammation of the plantar fascia. Some people have heel spurs that can be seen on x-ray. The heel spur is actually a minor  component of plantar fascitis and is largely ignored.       SELF TREATMENT   The easiest solution is to stop walking around your home without shoes. Plantar fasciitis is largely a shoe problem. Shoes are either not being worn often enough or your current shoes are inadequate for your weight, foot structure or activity level. The majority of shoes on the market today are not sufficient to resist development of plantar fasciitis or to promote healing. Assume that your current shoes are inadequate and will need to be replaced. Even high quality shoes wear out with 6 months to one year of frequent use. Weight loss is another option. Losing ten pounds in the next two months may be enough to resolve the problem. Ice applied to the area of pain two to three times per day for ten minutes each session can be very helpful. Warm foot soaks in epsom salts can also relieve pain. This should continue until the problem resolves. Achilles tendon stretching is essential. Stretch multiple times daily to promote healing and to prevent recurrence in the future. Over all stretching of the body is helpful as well such as the calves, thighs and lower back. Normally when one area of the body is tight, other areas are too. Gentle Yoga can be good for this.     Over the counter topical anti inflammatories can be helpful such as biofreeze, bengay, salon pas, ect...  Oral ibuprofen or aleve is recommended as well to try to calm down inflammation.     Night splints can be helpful to gradually stretch the foot at night as a lot of pain is when you get up in the morning. Taking a towel or thera band and stretching the foot back multiple times before you get ou of bed can be beneficial as well.     MEDICAL TREATMENT  Medical treatments often include custom arch supports, cortisone injections, physical therapy, splints to be worn in bed, prescription medications and surgery. The home treatments listed above will be necessary regardless of these  advanced medical treatments. Surgery is rarely needed but is very helpful in selected cases.     PROGNOSIS  Plantar fasciitis can last from one day to a lifetime. Some people get intermittent fascitis that is very short-lived. Others suffer daily for years. Excessive body weight, frequent bare foot walking, long hours on the feet, inadequate shoes, predisposing foot structures and excessive activity such as running are all potential issues that lead to chronic and/or recurring plantar fascitis. Having plantar fasciitis means that you are forever prone to this problem and will require modification of some of the above factors. Most people seek treatment within one to four months. Healing usually requires a similar one to four month time frame. Healing time is relative to the amount of effort spent treating the problem.   Plantar fasciitis is highly recurrent. Risk factors often continue, including return to bare foot walking, inadequate shoes, excessive body weight, excessive activities, etc. Your life style and foot structure may predispose you to recurrent plantar fasciitis. A daily prevention regimen can be very helpful. Ongoing use of shoe inserts, careful attention to appropriate shoes, daily Achilles stretching, etc. may prevent recurrence. Prompt attention at the earliest warning signs of heel pain can resolve the problem in as short as a few days.     EXERCISES  Stair Exercise: Step on the stairs with the ball of your foot and hold your position for at least 15 seconds, then slowly step down with the heels of your foot. You can do this daily and as often as you want.   Picking the Towel: Sit comfortably and then pick the towel up with your toes. You can use any object other than a towel as long as the material can be soft and you can pick it up with your toes.  Rolling the Bottle: Use a small ball or frozen water bottle and then roll it around with your foot.   Flex the Toes: Sit comfortably and then flex  your toes by pointing it towards the floor or towards your body. This will relax and flex your foot and exercise your plantar fascia, the calf, and the Achilles tendon. The inability of the foot to stretch often causes the bunching up of the plantar fascia area leading to the pain.  Calf/Achilles Stretching: Lay on you back and raise one foot, then point your toes towards the floor. See photo below:               Hold each stretch for 10 seconds. Stretch 10 times per set, three sets per day. Morning, afternoon and evening. If your heel pain is very severe in the morning, consider doing the first set of stretches before you get out of bed.      OVER THE COUNTER INSERT RECOMMENDATIONS  SuperFeet   Sofsole Fit Spenco   Power Step   Walk-Fit Arch Cradles     Most of these can be found at your local [employer], [employer], or online.  **A good high quality over the counter insert should cost around $40-$50      [employer] LOCATIONS  [place]  [address] [place] [address]  [phone]   [address]  [phone] [place]  [address]  [phone]   [place]  [address]  [phone]   [place]  [address] [facility]  [phone] [place]  [address] [place] [address] [place]  [phone]   [place]  [address]  [phone]   [place]  [address]  [phone] [place]  [address]  [phone]           Body Mass Index (BMI)  Many things can cause foot and ankle problems. Foot structure, activity level, foot mechanics and injuries are common causes of pain.  One very important issue that often goes unmentioned is body weight. Extra weight can cause increased stress on muscles, ligaments, bones and tendons. Sometimes just a few extra pounds is all it takes to put one over her/his threshold. Without reducing that stress, it can be difficult to alleviate pain.   Some people are uncomfortable addressing this issue, but we feel it is important for  you to think about it. As Foot & Ankle specialists, our job is addressing the lower extremity problem and possible causes.   Regarding extra body weight, we encourage patients to discuss diet and weight management plans with their primary care doctors. It is this team approach that gives you the best opportunity for pain relief and getting you back on your feet.

## 2018-06-28 ENCOUNTER — HOSPITAL ENCOUNTER (EMERGENCY)
Facility: CLINIC | Age: 43
Discharge: HOME | End: 2018-06-29
Attending: EMERGENCY MEDICINE | Admitting: EMERGENCY MEDICINE
Payer: COMMERCIAL

## 2018-06-28 ENCOUNTER — APPOINTMENT (OUTPATIENT)
Dept: GENERAL RADIOLOGY | Facility: CLINIC | Age: 43
End: 2018-06-28
Attending: EMERGENCY MEDICINE
Payer: COMMERCIAL

## 2018-06-28 DIAGNOSIS — S92.501A CLOSED FRACTURE OF PHALANX OF RIGHT FIFTH TOE, INITIAL ENCOUNTER: ICD-10-CM

## 2018-06-28 PROCEDURE — 28510 TREATMENT OF TOE FRACTURE: CPT | Mod: RT

## 2018-06-28 PROCEDURE — 73630 X-RAY EXAM OF FOOT: CPT | Mod: RT

## 2018-06-28 PROCEDURE — 99284 EMERGENCY DEPT VISIT MOD MDM: CPT | Mod: 25

## 2018-06-28 RX ORDER — ACETAMINOPHEN 325 MG/1
975 TABLET ORAL ONCE
Status: COMPLETED | OUTPATIENT
Start: 2018-06-29 | End: 2018-06-29

## 2018-06-28 RX ORDER — IBUPROFEN 600 MG/1
600 TABLET, FILM COATED ORAL ONCE
Status: COMPLETED | OUTPATIENT
Start: 2018-06-29 | End: 2018-06-29

## 2018-06-28 NOTE — ED AVS SNAPSHOT
Emergency Department    [address] [facility] [address]    Phone:  [phone]    Fax:  [phone]                                       Isabel Mathias   MRN: [id number]    Department:   Emergency Department   Date of Visit:  6/28/2018           After Visit Summary Signature Page     I have received my discharge instructions, and my questions have been answered. I have discussed any challenges I see with this plan with the nurse or doctor.    ..........................................................................................................................................  Patient/Patient Representative Signature      ..........................................................................................................................................  Patient Representative Print Name and Relationship to Patient    ..................................................               ................................................  Date                                            Time    ..........................................................................................................................................  Reviewed by Signature/Title    ...................................................              ..............................................  Date                                                            Time

## 2018-06-28 NOTE — ED AVS SNAPSHOT
Emergency Department    [address] [facility] [address]    Phone:  [phone]    Fax:  [phone]                                       Isabel Mathias   MRN: [id number]    Department:   Emergency Department   Date of Visit:  6/28/2018           Patient Information     Date Of Birth          1975        Your diagnoses for this visit were:     Closed fracture of phalanx of right fifth toe, initial encounter        You were seen by Jerel Archibald MD.      Follow-up Information     Follow up with  Emergency Department.    Specialty:  EMERGENCY MEDICINE    Why:  As needed, If symptoms worsen    Contact information:    [address] [facility] [address] [phone]        Follow up with Anderson Ching MD. Schedule an appointment as soon as possible for a visit in 3 days.    Specialty:  Orthopaedic Surgery    Contact information:    [facility]  [address] [facility] [address]  [phone]          Discharge Instructions         Closed Toe Fracture  Your toe is broken (fractured). This causes local pain, swelling, and sometimes bruising. This injury usually takes about 4 to 6 weeks to heal, but can sometimes take longer. Toe injuries are often treated by taping the injured toe to the next one (buddy taping). This protects the injured toe and holds it in position.     If the toenail has been severely injured, it may fall off in 1 to 2 weeks. It takes up to 12 months for a new toenail to grow back.  Home care  Follow these guidelines when caring for yourself at home:    You may be given a cast shoe to wear to keep your toe from moving. If not, you can use a sandal or any shoe that doesn t put pressure on the injured toe until the swelling and pain go away. If using a sandal, be careful not to strike your foot against anything. Another injury could make the fracture worse. If you were given crutches, don t put full weight on the injured foot until you  can do so without pain, or as directed by your healthcare provider.    Keep your foot elevated to reduce pain and swelling. When sleeping, put a pillow under the injured leg. When sitting, support the injured leg so it is above your waist. This is very important during the first 2 days (48 hours).    Put an ice pack on the injured area. Do this for 20 minutes every 1 to 2 hours the first day for pain relief. You can make an ice pack by wrapping a plastic bag of ice cubes in a thin towel. As the ice melts, be careful that any cloth or paper tape doesn t get wet. Continue using the ice pack 3 to 4 times a day for the next 2 days. Then use the ice pack as needed to ease pain and swelling.    If buddy tape was used and it becomes wet or dirty, change it. You may replace it with paper, plastic, or cloth tape. Cloth tape and paper tapes must be kept dry.    You may use acetaminophen or ibuprofen to control pain, unless another pain medicine was prescribed. If you have chronic liver or kidney disease, talk with your healthcare provider before using these medicines. Also talk with your provider if you ve had a stomach ulcer or gastrointestinal bleeding.    You may return to sports or physical education activities after 4 weeks when you can run without pain, or as directed by your healthcare provider.  Follow-up care  Follow up with your healthcare provider in 1 week, or as advised. This is to make sure the bone is healing the way it should.  X-rays may be taken. You will be told of any new findings that may affect your care.  When to seek medical advice  Call your healthcare provider right away if any of these occur:    Pain or swelling gets worse    The cast/splint cracks    The cast and padding get wet and stays wet more than 24 hours    Bad odor from the cast/splint or wound fluid stains the cast    Tightness or pressure under the cast/splint gets worse    Toe becomes cold, blue, numb, or tingly    You can t move the  toe    Signs of infection: fever, redness, warmth, swelling, or drainage from the wound or cast    Fever of 100.4 F (38 C) or higher, or as directed by your healthcare provider  Date Last Reviewed: 2/1/2017 2000-2017 [employer]. [address] [place] [address]. All rights reserved. This information is not intended as a substitute for professional medical care. Always follow your healthcare professional's instructions.          24 Hour Appointment Hotline       To make an appointment at any [facility], call [phone] ([phone]). If you don't have a family doctor or clinic, we will help you find one. [facility] clinics are conveniently located to serve the needs of you and your family.             Review of your medicines      START taking        Dose / Directions Last dose taken    HYDROcodone-acetaminophen 5-325 MG per tablet   Commonly known as:  NORCO   Dose:  1 tablet   Quantity:  10 tablet        Take 1 tablet by mouth every 6 hours as needed for severe pain   Refills:  0        ibuprofen 600 MG tablet   Commonly known as:  ADVIL/MOTRIN   Dose:  600 mg   Quantity:  30 tablet        Take 1 tablet (600 mg) by mouth every 8 hours as needed for moderate pain   Refills:  0          Our records show that you are taking the medicines listed below. If these are incorrect, please call your family doctor or clinic.        Dose / Directions Last dose taken    azithromycin 250 MG tablet   Commonly known as:  ZITHROMAX   Quantity:  6 tablet        Two tablets first day, then one tablet daily for four days.   Refills:  0        calcium carbonate 500 MG tablet   Commonly known as:  OS-DOLORES 500 mg [place]. [place]   Dose:  500 mg   Quantity:  180 tablet        Take 1 tablet (500 mg) by mouth 2 times daily   Refills:  3        fexofenadine 180 MG tablet   Commonly known as:  ALLEGRA   Dose:  180 mg   Quantity:  30 tablet        Take 1 tablet (180 mg) by mouth daily   Refills:  1         multivitamin, therapeutic with minerals Tabs tablet   Dose:  1 tablet   Quantity:  100 tablet        Take 1 tablet by mouth daily   Refills:  3        nabumetone 500 MG tablet   Commonly known as:  RELAFEN   Dose:  500-1000 mg   Quantity:  30 tablet        Take 1-2 tablets (500-1,000 mg) by mouth 2 times daily as needed for moderate pain   Refills:  0        naproxen 500 MG tablet   Commonly known as:  NAPROSYN   Dose:  500 mg   Quantity:  30 tablet        Take 1 tablet (500 mg) by mouth 2 times daily as needed for moderate pain   Refills:  0        omeprazole 20 MG CR capsule   Commonly known as:  priLOSEC   Dose:  20 mg   Quantity:  90 capsule        Take 1 capsule (20 mg) by mouth daily   Refills:  1        order for DME   Quantity:  1 Device        Equipment being ordered: superfeet inserts- size D   Refills:  0        sucralfate 1 GM/10ML suspension   Commonly known as:  CARAFATE   Dose:  1 g   Quantity:  420 mL        Take 10 mLs (1 g) by mouth 4 times daily   Refills:  1        traMADol 50 MG tablet   Commonly known as:  ULTRAM   Dose:  50 mg   Quantity:  30 tablet        Take 1 tablet (50 mg) by mouth 2 times daily as needed for moderate pain   Refills:  0        * vitamin D 2000 units tablet   Dose:  2000 Units   Quantity:  100 tablet        Take 2,000 Units by mouth daily   Refills:  3        * vitamin D 2000 units tablet   Dose:  2000 Units   Quantity:  100 tablet        Take 2,000 Units by mouth daily   Refills:  3        * Notice:  This list has 2 medication(s) that are the same as other medications prescribed for you. Read the directions carefully, and ask your doctor or other care provider to review them with you.            Information about OPIOIDS     PRESCRIPTION OPIOIDS: WHAT YOU NEED TO KNOW   We gave you an opioid (narcotic) pain medicine. It is important to manage your pain, but opioids are not always the best choice. You should first try all the other options your care team gave you. Take  this medicine for as short a time (and as few doses) as possible.     These medicines have risks:    DO NOT drive when on new or higher doses of pain medicine. These medicines can affect your alertness and reaction times, and you could be arrested for driving under the influence (DUI). If you need to use opioids long-term, talk to your care team about driving.    DO NOT operate heave machinery    DO NOT do any other dangerous activities while taking these medicines.     DO NOT drink any alcohol while taking these medicines.      If the opioid prescribed includes acetaminophen, DO NOT take with any other medicines that contain acetaminophen. Read all labels carefully. Look for the word  acetaminophen  or  Tylenol.  Ask your pharmacist if you have questions or are unsure.    You can get addicted to pain medicines, especially if you have a history of addiction (chemical, alcohol or substance dependence). Talk to your care team about ways to reduce this risk.    Store your pills in a secure place, locked if possible. We will not replace any lost or stolen medicine. If you don t finish your medicine, please throw away (dispose) as directed by your pharmacist. The [place] Pollution Control Agency has more information about safe disposal: https://www.pca.[facility].[place].us/living-green/managing-unwanted-medications.     All opioids tend to cause constipation. Drink plenty of water and eat foods that have a lot of fiber, such as fruits, vegetables, prune juice, apple juice and high-fiber cereal. Take a laxative (Miralax, milk of magnesia, Colace, Senna) if you don t move your bowels at least every other day.         Prescriptions were sent or printed at these locations (2 Prescriptions)                   Other Prescriptions                Printed at Department/Unit printer (2 of 2)         HYDROcodone-acetaminophen (NORCO) 5-325 MG per tablet               ibuprofen (ADVIL/MOTRIN) 600 MG tablet                Procedures and  tests performed during your visit     XR Foot Right G/E 3 Views      Orders Needing Specimen Collection     None      Pending Results     No orders found for last 3 day(s).            Pending Culture Results     No orders found for last 3 day(s).            Pending Results Instructions     If you had any lab results that were not finalized at the time of your Discharge, you can call the ED Lab Result RN at [phone]. You will be contacted by this team for any positive Lab results or changes in treatment. The nurses are available 7 days a week from 10A to 6:30P.  You can leave a message 24 hours per day and they will return your call.        Test Results From Your Hospital Stay              6/29/2018 12:23 AM      Narrative     XR FOOT RT G/E 3 VW  6/29/2018 12:10 AM     INDICATION: Mechanical injury and slight deformity to right fifth toe.    COMPARISON: 2/27/2015.        Impression     IMPRESSION: Suggestion of a possible very subtle nondisplaced fracture  involving the proximal aspect of the fifth proximal phalanx laterally.  This is only seen on one view and is equivocal. Correlate clinically  with site of pain and injury. Otherwise negative.    MALINI BAILEY MD                Clinical Quality Measure: Blood Pressure Screening     Your blood pressure was checked while you were in the emergency department today. The last reading we obtained was  BP: 115/69 . Please read the guidelines below about what these numbers mean and what you should do about them.  If your systolic blood pressure (the top number) is less than 120 and your diastolic blood pressure (the bottom number) is less than 80, then your blood pressure is normal. There is nothing more that you need to do about it.  If your systolic blood pressure (the top number) is 120-139 or your diastolic blood pressure (the bottom number) is 80-89, your blood pressure may be higher than it should be. You should have your blood pressure rechecked within a  "year by a primary care provider.  If your systolic blood pressure (the top number) is 140 or greater or your diastolic blood pressure (the bottom number) is 90 or greater, you may have high blood pressure. High blood pressure is treatable, but if left untreated over time it can put you at risk for heart attack, stroke, or kidney failure. You should have your blood pressure rechecked by a primary care provider within the next 4 weeks.  If your provider in the emergency department today gave you specific instructions to follow-up with your doctor or provider even sooner than that, you should follow that instruction and not wait for up to 4 weeks for your follow-up visit.        Thank you for choosing [facility]       Thank you for choosing [facility] for your care. Our goal is always to provide you with excellent care. Hearing back from our patients is one way we can continue to improve our services. Please take a few minutes to complete the written survey that you may receive in the mail after you visit with us. Thank you!        [address] Information     [employer] lets you send messages to your doctor, view your test results, renew your prescriptions, schedule appointments and more. To sign up, go to [email or web address] . Click on \"Log in\" on the left side of the screen, which will take you to the Welcome page. Then click on \"Sign up Now\" on the right side of the page.     You will be asked to enter the access code listed below, as well as some personal information. Please follow the directions to create your username and password.     Your access code is: [email or web address]  Expires: 2018 12:37 AM     Your access code will  in 90 days. If you need help or a new code, please call your [facility] clinic or [phone].        Care EveryWhere ID     This is your Care EveryWhere ID. This could be used by other organizations to access your [facility] medical records  [id number]        Equal Access to Services     LEONOR" SRINIVASA : Arianaii rosio Nelson, wasammieda luqadaha, qaybta kajoana del castillo, luis hanson. So [facility] [phone].    ATENCIÓN: Si habla español, tiene a gonzalez disposición servicios gratuitos de asistencia lingüística. Llame al [phone].    We comply with applicable federal civil rights laws and [place] laws. We do not discriminate on the basis of race, color, national origin, age, disability, sex, sexual orientation, or gender identity.            After Visit Summary       This is your record. Keep this with you and show to your community pharmacist(s) and doctor(s) at your next visit.

## 2018-06-28 NOTE — LETTER
June 29, 2018      To Whom It May Concern:      Isabel Mathias was seen in our Emergency Department today, 06/29/18.  I expect her condition to improve over the next 3-4 days.  She may require assistance with transport if unable to walk.  Sincerely,        Zbigniew Shoemaker PA-C

## 2018-06-29 VITALS
WEIGHT: 182 LBS | BODY MASS INDEX: 32.25 KG/M2 | HEART RATE: 71 BPM | SYSTOLIC BLOOD PRESSURE: 109 MMHG | HEIGHT: 63 IN | TEMPERATURE: 98.4 F | DIASTOLIC BLOOD PRESSURE: 73 MMHG | RESPIRATION RATE: 16 BRPM | OXYGEN SATURATION: 99 %

## 2018-06-29 PROCEDURE — 25000132 ZZH RX MED GY IP 250 OP 250 PS 637: Performed by: PHYSICIAN ASSISTANT

## 2018-06-29 RX ORDER — HYDROCODONE BITARTRATE AND ACETAMINOPHEN 5; 325 MG/1; MG/1
1 TABLET ORAL EVERY 6 HOURS PRN
Qty: 10 TABLET | Refills: 0 | Status: SHIPPED | OUTPATIENT
Start: 2018-06-29 | End: 2022-05-26

## 2018-06-29 RX ORDER — IBUPROFEN 600 MG/1
600 TABLET, FILM COATED ORAL EVERY 8 HOURS PRN
Qty: 30 TABLET | Refills: 0 | Status: SHIPPED | OUTPATIENT
Start: 2018-06-29 | End: 2022-05-26

## 2018-06-29 RX ADMIN — IBUPROFEN 600 MG: 600 TABLET ORAL at 00:32

## 2018-06-29 RX ADMIN — ACETAMINOPHEN 975 MG: 325 TABLET, FILM COATED ORAL at 00:32

## 2018-06-29 ASSESSMENT — ENCOUNTER SYMPTOMS
COLOR CHANGE: 1
JOINT SWELLING: 1

## 2018-06-29 NOTE — DISCHARGE INSTRUCTIONS
Closed Toe Fracture  Your toe is broken (fractured). This causes local pain, swelling, and sometimes bruising. This injury usually takes about 4 to 6 weeks to heal, but can sometimes take longer. Toe injuries are often treated by taping the injured toe to the next one (buddy taping). This protects the injured toe and holds it in position.     If the toenail has been severely injured, it may fall off in 1 to 2 weeks. It takes up to 12 months for a new toenail to grow back.  Home care  Follow these guidelines when caring for yourself at home:    You may be given a cast shoe to wear to keep your toe from moving. If not, you can use a sandal or any shoe that doesn t put pressure on the injured toe until the swelling and pain go away. If using a sandal, be careful not to strike your foot against anything. Another injury could make the fracture worse. If you were given crutches, don t put full weight on the injured foot until you can do so without pain, or as directed by your healthcare provider.    Keep your foot elevated to reduce pain and swelling. When sleeping, put a pillow under the injured leg. When sitting, support the injured leg so it is above your waist. This is very important during the first 2 days (48 hours).    Put an ice pack on the injured area. Do this for 20 minutes every 1 to 2 hours the first day for pain relief. You can make an ice pack by wrapping a plastic bag of ice cubes in a thin towel. As the ice melts, be careful that any cloth or paper tape doesn t get wet. Continue using the ice pack 3 to 4 times a day for the next 2 days. Then use the ice pack as needed to ease pain and swelling.    If buddy tape was used and it becomes wet or dirty, change it. You may replace it with paper, plastic, or cloth tape. Cloth tape and paper tapes must be kept dry.    You may use acetaminophen or ibuprofen to control pain, unless another pain medicine was prescribed. If you have chronic liver or kidney disease,  talk with your healthcare provider before using these medicines. Also talk with your provider if you ve had a stomach ulcer or gastrointestinal bleeding.    You may return to sports or physical education activities after 4 weeks when you can run without pain, or as directed by your healthcare provider.  Follow-up care  Follow up with your healthcare provider in 1 week, or as advised. This is to make sure the bone is healing the way it should.  X-rays may be taken. You will be told of any new findings that may affect your care.  When to seek medical advice  Call your healthcare provider right away if any of these occur:    Pain or swelling gets worse    The cast/splint cracks    The cast and padding get wet and stays wet more than 24 hours    Bad odor from the cast/splint or wound fluid stains the cast    Tightness or pressure under the cast/splint gets worse    Toe becomes cold, blue, numb, or tingly    You can t move the toe    Signs of infection: fever, redness, warmth, swelling, or drainage from the wound or cast    Fever of 100.4 F (38 C) or higher, or as directed by your healthcare provider  Date Last Reviewed: 2/1/2017 2000-2017 [employer]. [address] [place] [address]. All rights reserved. This information is not intended as a substitute for professional medical care. Always follow your healthcare professional's instructions.

## 2018-06-29 NOTE — ED NOTES
Emergency Department Attending Supervision Note  6/29/2018  12:07 AM    I evaluated this patient in conjunction with Zbigniew Shoemaker PA-C    Briefly, the patient presented with right toe pain. The patient struck her toe on a table this evening. She has been able to ambulate and bear weight with her right foot but has been experiencing a lot of pain since the incident. She did not take any medications for her pain prior to arrival.      On my exam, the patient has tenderness to the fifth toe but no obvious deformities. Patient has no further injuries or pain to the remainder of the foot.     MDM:  Isabel Mathias is a 43-year-old woman presents due to right fifth toe pain.  She accidentally struck her foot on a piece of furniture earlier in the afternoon and given the continued pain she came to the ER.  On my evaluation there was some tenderness of the fifth toe but no obvious bony deformity and no pain to the remainder the foot.  X-rays were obtained that did show a possible fracture but was nondisplaced.  The toe was splinted and the patient was provided with a hard soled shoe.  She is recommended ice and elevation and follow-up with orthopedics.    Diagnosis    ICD-10-CM   1. Closed fracture of phalanx of right fifth toe, initial encounter S92.501A     Jerel Archibald MD    I, Fadumo Wick, am serving as a scribe at 12:07 AM on 6/29/2018 to document services personally performed by Jerel Archibald MD based on my observations and the provider's statements to me.       Jerel Archibald MD  07/03/18 024

## 2018-06-29 NOTE — ED NOTES
Patient states she stubbed right 5th toe on table, patient states it bent backwards. Patient rates pain 10/10.  No other complains or pain anywhere else.

## 2018-06-29 NOTE — ED PROVIDER NOTES
"  History     Chief Complaint:  Toe pain    HPI   Isabel Mathias is a 43 year old female who presents with toe pain.  The patient reports that she was walking in her house when she struck her toe on a wooden table.  She states that is been hurting significantly since, and is painful to walk on.  Denies numbness or tingling in foot or toes.  No other symptoms at this time.  She has not taken anything for the pain yet.    Allergies:  No known drug allergies      Medications:    Os-isidro  Vitamin D  Multi-vitamin  Relafen  Prilosec  Carafate    Past Medical History:    Esophageal reflux  Hyperlipidemia  Presence of intrauterine contraceptive device  Sleep related bruxism   H. Pylori infection    Past Surgical History:    History reviewed. No pertinent surgical history.     Family History:    Diabetes  Hypertension  Lipids     Social History:  Smoking status: Never smoker  Alcohol use: No   Marital Status:   [2]  PCP: [facility] [place]      Review of Systems   Musculoskeletal: Positive for joint swelling.   Skin: Positive for color change.   All other systems reviewed and are negative.      Physical Exam   Patient Vitals for the past 24 hrs:   BP Temp Temp src Heart Rate Resp SpO2 Height Weight   06/28/18 2322 115/69 98.4  F (36.9  C) Oral 70 16 99 % 1.6 m (5' 3\") 82.6 kg (182 lb)      Physical Exam  MSK: Focused exam of the foot shows swelling, bruising, and mild deformity of right fifth toe.  Mild tenderness to palpation over distal right fourth and fifth metatarsal.  No tenderness to palpation over navicular bone, or proximal fifth metatarsal.  Normal range of motion and ankle.    SKIN:  Warm and dry with strong DP pulse and normal capillary refill.    NEURO:  Normal sensation throughout the foot and ankle.     PSYCH:  Normal affect    Emergency Department Course   Imaging:  Radiographic findings were communicated with the patient who voiced understanding of the findings.  XR Foot Right G/E 2 " views  Suggestion of a possible very subtle nondisplaced fracture  involving the proximal aspect of the fifth proximal phalanx laterally.  This is only seen on one view and is equivocal. Correlate clinically  with site of pain and injury. Otherwise negative.  As read by Radiology.     Interventions:  0032: Advil/Motrin 600 mg oral  0032: Tylenol 975 mg oral         Emergency Department Course:  Past medical records, nursing notes, and vitals reviewed.  2355: I performed an exam of the patient and obtained history, as documented above.   Patient was given the above medications here in the emergency department.   The patient was sent for a foot XR while in the emergency department, findings above.   0030: I rechecked the patient. Findings and plan explained to the Patient. Patient discharged home with instructions regarding supportive care, medications, and reasons to return. The importance of close follow-up was reviewed.      Impression & Plan    Medical Decision Making:  Isabel Mathias is a 43-year-old female presents with right fifth toe pain after striking it on the table.  Patient history and records reviewed.  X-ray was obtained as above and showed possible nondisplaced fracture of right fifth toe.  There was no evidence of open fracture, and capillary refill, sensation, and range of motion intact.  The patient was given ibuprofen and Tylenol in the ED for pain.  She was given orthopedic shoe and toe was blaine taped.  She was discharged home with prescriptions as below and precautions for not driving while on pain medication.  She will follow-up with orthopedics.  Discussed indications to return to the ED if new or worsening symptoms.    Diagnosis:    ICD-10-CM   1. Closed fracture of phalanx of right fifth toe, initial encounter S92.501A     Disposition:  discharged to home    Discharge Medications:  New Prescriptions    HYDROCODONE-ACETAMINOPHEN (NORCO) 5-325 MG PER TABLET    Take 1 tablet by mouth every 6  hours as needed for severe pain    IBUPROFEN (ADVIL/MOTRIN) 600 MG TABLET    Take 1 tablet (600 mg) by mouth every 8 hours as needed for moderate pain     Zbigniew Shoemaker PA-C  6/28/2018    EMERGENCY DEPARTMENT       Zbigniew Shoemaker PA-C  06/29/18 0121

## 2020-04-29 ENCOUNTER — TRANSFERRED RECORDS (OUTPATIENT)
Dept: HEALTH INFORMATION MANAGEMENT | Facility: CLINIC | Age: 45
End: 2020-04-29

## 2021-07-13 ENCOUNTER — TELEPHONE (OUTPATIENT)
Dept: PHARMACY | Facility: CLINIC | Age: 46
End: 2021-07-13

## 2021-07-13 NOTE — TELEPHONE ENCOUNTER
Pt. Called mtm today for :    safia ford for her to call me back today on my vcmail at [phone].      Marty Mckinnon Rph.  Medication Therapy Management Provider  [phone]

## 2022-05-26 ENCOUNTER — OFFICE VISIT (OUTPATIENT)
Dept: INTERNAL MEDICINE | Facility: CLINIC | Age: 47
End: 2022-05-26
Payer: COMMERCIAL

## 2022-05-26 VITALS
WEIGHT: 179 LBS | SYSTOLIC BLOOD PRESSURE: 110 MMHG | BODY MASS INDEX: 30.56 KG/M2 | RESPIRATION RATE: 16 BRPM | HEART RATE: 50 BPM | HEIGHT: 64 IN | TEMPERATURE: 97 F | DIASTOLIC BLOOD PRESSURE: 70 MMHG | OXYGEN SATURATION: 99 %

## 2022-05-26 DIAGNOSIS — E55.9 VITAMIN D DEFICIENCY: ICD-10-CM

## 2022-05-26 DIAGNOSIS — M19.90 ARTHRITIS: Primary | ICD-10-CM

## 2022-05-26 DIAGNOSIS — R53.83 OTHER FATIGUE: ICD-10-CM

## 2022-05-26 DIAGNOSIS — E78.5 HYPERLIPIDEMIA LDL GOAL <100: ICD-10-CM

## 2022-05-26 DIAGNOSIS — Z13.1 SCREENING FOR DIABETES MELLITUS: ICD-10-CM

## 2022-05-26 DIAGNOSIS — M54.12 CERVICAL RADICULOPATHY: ICD-10-CM

## 2022-05-26 LAB
ALBUMIN SERPL-MCNC: 3.9 G/DL (ref 3.4–5)
ALP SERPL-CCNC: 78 U/L (ref 40–150)
ALT SERPL W P-5'-P-CCNC: 25 U/L (ref 0–50)
ANION GAP SERPL CALCULATED.3IONS-SCNC: 3 MMOL/L (ref 3–14)
AST SERPL W P-5'-P-CCNC: 17 U/L (ref 0–45)
BILIRUB SERPL-MCNC: 0.4 MG/DL (ref 0.2–1.3)
BUN SERPL-MCNC: 12 MG/DL (ref 7–30)
CALCIUM SERPL-MCNC: 9.1 MG/DL (ref 8.5–10.1)
CHLORIDE BLD-SCNC: 110 MMOL/L (ref 94–109)
CHOLEST SERPL-MCNC: 194 MG/DL
CO2 SERPL-SCNC: 28 MMOL/L (ref 20–32)
CREAT SERPL-MCNC: 0.58 MG/DL (ref 0.52–1.04)
CRP SERPL-MCNC: 4.5 MG/L (ref 0–8)
DEPRECATED CALCIDIOL+CALCIFEROL SERPL-MC: 18 UG/L (ref 20–75)
ERYTHROCYTE [DISTWIDTH] IN BLOOD BY AUTOMATED COUNT: 12.2 % (ref 10–15)
ERYTHROCYTE [SEDIMENTATION RATE] IN BLOOD BY WESTERGREN METHOD: 25 MM/HR (ref 0–20)
FASTING STATUS PATIENT QL REPORTED: YES
GFR SERPL CREATININE-BSD FRML MDRD: >90 ML/MIN/1.73M2
GLUCOSE BLD-MCNC: 99 MG/DL (ref 70–99)
HCT VFR BLD AUTO: 37 % (ref 35–47)
HDLC SERPL-MCNC: 49 MG/DL
HGB BLD-MCNC: 12.1 G/DL (ref 11.7–15.7)
LDLC SERPL CALC-MCNC: 126 MG/DL
MCH RBC QN AUTO: 31.2 PG (ref 26.5–33)
MCHC RBC AUTO-ENTMCNC: 32.7 G/DL (ref 31.5–36.5)
MCV RBC AUTO: 95 FL (ref 78–100)
NONHDLC SERPL-MCNC: 145 MG/DL
PLATELET # BLD AUTO: 269 10E3/UL (ref 150–450)
POTASSIUM BLD-SCNC: 4 MMOL/L (ref 3.4–5.3)
PROT SERPL-MCNC: 8.1 G/DL (ref 6.8–8.8)
RBC # BLD AUTO: 3.88 10E6/UL (ref 3.8–5.2)
SODIUM SERPL-SCNC: 141 MMOL/L (ref 133–144)
TRIGL SERPL-MCNC: 95 MG/DL
TSH SERPL DL<=0.005 MIU/L-ACNC: 0.74 MU/L (ref 0.4–4)
URATE SERPL-MCNC: 4.7 MG/DL (ref 2.6–6)
VIT B12 SERPL-MCNC: 492 PG/ML (ref 193–986)
WBC # BLD AUTO: 4.6 10E3/UL (ref 4–11)

## 2022-05-26 PROCEDURE — 36415 COLL VENOUS BLD VENIPUNCTURE: CPT | Performed by: INTERNAL MEDICINE

## 2022-05-26 PROCEDURE — 82306 VITAMIN D 25 HYDROXY: CPT | Performed by: INTERNAL MEDICINE

## 2022-05-26 PROCEDURE — 82607 VITAMIN B-12: CPT | Performed by: INTERNAL MEDICINE

## 2022-05-26 PROCEDURE — 85652 RBC SED RATE AUTOMATED: CPT | Performed by: INTERNAL MEDICINE

## 2022-05-26 PROCEDURE — 86038 ANTINUCLEAR ANTIBODIES: CPT | Performed by: INTERNAL MEDICINE

## 2022-05-26 PROCEDURE — 84550 ASSAY OF BLOOD/URIC ACID: CPT | Performed by: INTERNAL MEDICINE

## 2022-05-26 PROCEDURE — 80061 LIPID PANEL: CPT | Performed by: INTERNAL MEDICINE

## 2022-05-26 PROCEDURE — 80053 COMPREHEN METABOLIC PANEL: CPT | Performed by: INTERNAL MEDICINE

## 2022-05-26 PROCEDURE — 84443 ASSAY THYROID STIM HORMONE: CPT | Performed by: INTERNAL MEDICINE

## 2022-05-26 PROCEDURE — 86431 RHEUMATOID FACTOR QUANT: CPT | Performed by: INTERNAL MEDICINE

## 2022-05-26 PROCEDURE — 86140 C-REACTIVE PROTEIN: CPT | Performed by: INTERNAL MEDICINE

## 2022-05-26 PROCEDURE — 99204 OFFICE O/P NEW MOD 45 MIN: CPT | Performed by: INTERNAL MEDICINE

## 2022-05-26 PROCEDURE — 85027 COMPLETE CBC AUTOMATED: CPT | Performed by: INTERNAL MEDICINE

## 2022-05-26 RX ORDER — METHYLPREDNISOLONE 4 MG
TABLET, DOSE PACK ORAL
Qty: 21 TABLET | Refills: 0 | Status: SHIPPED | OUTPATIENT
Start: 2022-05-26

## 2022-05-26 NOTE — PATIENT INSTRUCTIONS
Medrol dosepack over 6 days to reduce inflammation in neck and body  Will get copy of MRI cervical spine from  [facility]  Physical therapy for neck/upper back. They will call to schedule or you may call to set up  Labs as ordered   If neck and right side not improved with above, then follow-up in clinic in 1 month

## 2022-05-27 LAB
ANA SER QL IF: NEGATIVE
RHEUMATOID FACT SER NEPH-ACNC: <6 IU/ML

## 2022-06-22 ENCOUNTER — THERAPY VISIT (OUTPATIENT)
Dept: PHYSICAL THERAPY | Facility: CLINIC | Age: 47
End: 2022-06-22
Attending: INTERNAL MEDICINE
Payer: COMMERCIAL

## 2022-06-22 DIAGNOSIS — M54.2 NECK PAIN: ICD-10-CM

## 2022-06-22 DIAGNOSIS — M19.90 ARTHRITIS: ICD-10-CM

## 2022-06-22 DIAGNOSIS — M54.12 CERVICAL RADICULOPATHY: ICD-10-CM

## 2022-06-22 DIAGNOSIS — M54.6 RIGHT-SIDED THORACIC BACK PAIN, UNSPECIFIED CHRONICITY: ICD-10-CM

## 2022-06-22 PROCEDURE — 97110 THERAPEUTIC EXERCISES: CPT | Mod: GP | Performed by: PHYSICAL THERAPIST

## 2022-06-22 PROCEDURE — 97162 PT EVAL MOD COMPLEX 30 MIN: CPT | Mod: GP | Performed by: PHYSICAL THERAPIST

## 2022-06-22 NOTE — PROGRESS NOTES
Physical Therapy Initial Evaluation  Subjective:  The history is provided by the patient and a relative. The history is limited by a language barrier.  used: daughter interpreted for patient.   Patient Health History  Isabel Mathias being seen for neck pain going down to lateral trunk (ribs) on R side.     Problem began: 1/22/2022.   Problem occurred: insiduous, however mother is in WC and she is a caregiver for her and this may have contributed to this   Pain is reported as 7/10 (7/10 in R neck/shoulder/arm, 10/10 at lateral ribs) on pain scale.  General health as reported by patient is good.  Health conditions: plantar fasciitis.   Red flags:  None as reported by patient.  Medical allergies: none.   Surgeries include:  None.    Current medications:  Other. Other medications details: vitamins.    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                     Therapist Generated HPI Evaluation         Type of problem:  Cervical spine and thoracic spine.    This is a chronic condition.  Condition occurred with:  Insidious onset.  Where condition occurred: for unknown reasons.  Patient reports pain:  Cervical right side and thoracic right side (lateral trunk along ribs).  Pain is described as sharp and aching (at lateral trunk) and is intermittent.  Pain radiates to:  Shoulder right, upper arm right, lower arm right and hand right. Pain timing: varies depending on movement/activity.  Since onset symptoms are gradually worsening.  Associated symptoms:  Numbness, tingling and loss of motion/stiffness (started in R hand and in fingers, and has progressed to down entire arm and down to lateral ribs on R side, shortness of breath with bending over to do cooking or day to day activities (shortness of breath happens with coming back to upright position)). Symptoms are exacerbated by lying down, carrying and lifting (day to day cooking and cleaning)  and relieved by nothing.  Special tests included:   MRI (done about a year ago on neck - not sure of the results).    Barriers include:  None as reported by patient.                        Objective:  System    Physical Exam    Donna Cervical Evaluation    Posture:  Sitting: poor  Standing: fair  Protruding Head: yes  Wry Neck: no  Correction of Posture: better    Movement Loss:    Flexion (Flex): nil  Retraction (RET): nil  Extension (EXT): nil  Lateral Flexion Right (LF R): nil  Lateral Flexion Left (LF L): nil  Rotation Right (ROT R): nil and pain  Rotation Left (ROT L): nil  Test Movements:      RET: During: decreases  After: better  Mechanical Response: IncROM  Repeat RET: During: decreases  After: better  Mechanical Response: IncROM                          Conclusion: derangement  Principle of Treatment:  Posture Correction: postural education/exercises    Extension: repeated cervical retraction x 10 repetitions, every 2-3 hrs for treating neck/UE pain                           Donna Thoracic Evalution:  Posture:  Sitting: poor  Standing: fair  Protruded Head: yes  Kyphosis: accentuated  Correction of Posture: better    Movement Loss:  Flexion (Flex):  Nil and pain  Extension (EXT): min  Rotation Lt (ROT L): min and pain  Rotation Rt (ROT R): min    Test Movements:      Extension:  During:  Decreases  After: better  Mechanical Response: IncROM  Rep Extension:  During:  Decreases  After: better  Mechanical Response: IncROM                          Conclusion: derangement  Principle of Treatment:  Posture Correction:  Postural education/exercises    Extension:  Seated thoracic extension x 10 repetitions, every 2 hrs for treating lateral R rib pain                      ROS    Assessment/Plan:    Patient is a 47 year old female with cervical and thoracic complaints.    Patient has the following significant findings with corresponding treatment plan.                Diagnosis 1:  Neck/RUE pain secondary to deranement  Pain -  directional preference exercise  and home program  Decreased ROM/flexibility - manual therapy, therapeutic exercise and home program  Decreased function - therapeutic activities and home program  Impaired posture - neuro re-education and home program  Diagnosis 2:  R lateral trunk/rib pain secondary to thoracic derangement   Pain -  directional preference exercise and home program  Decreased ROM/flexibility - manual therapy, therapeutic exercise and home program  Decreased function - therapeutic activities and home program  Impaired posture - neuro re-education and home program    Therapy Evaluation Codes:   1) History comprised of:   Personal factors that impact the plan of care:      Age and Time since onset of symptoms.    Comorbidity factors that impact the plan of care are:      None.     Medications impacting care: None.  2) Examination of Body Systems comprised of:   Body structures and functions that impact the plan of care:      Cervical spine and Thoracic Spine.   Activity limitations that impact the plan of care are:      Bathing, Bending, Cooking, Dressing, Lifting, Sitting and Sleeping.  3) Clinical presentation characteristics are:   Evolving/Changing.  4) Decision-Making    Moderate complexity using standardized patient assessment instrument and/or measureable assessment of functional outcome.  Cumulative Therapy Evaluation is: Moderate complexity.    Previous and current functional limitations:  (See Goal Flow Sheet for this information)    Short term and Long term goals: (See Goal Flow Sheet for this information)     Communication ability:  Patient has an  for communication clarity.  Treatment Explanation - The following has been discussed with the patient:   RX ordered/plan of care  Anticipated outcomes  Possible risks and side effects  This patient would benefit from PT intervention to resume normal activities.   Rehab potential is good.    Frequency:  1 X week, once daily  Duration:  for 6 weeks  Discharge Plan:   Achieve all LTG.  Independent in home treatment program.  Reach maximal therapeutic benefit.    Please refer to the daily flowsheet for treatment today, total treatment time and time spent performing 1:1 timed codes.

## 2022-06-23 NOTE — PROGRESS NOTES
[facility]    OUTPATIENT Physical Therapy ORTHOPEDIC EVALUATION  PLAN OF TREATMENT FOR OUTPATIENT REHABILITATION  (COMPLETE FOR INITIAL CLAIMS ONLY)  Patient's Last Name, First Name, M.I.  YOB: 1975  Isabel Mathias    Provider s Name:  [facility]   Medical Record No.  [id number]   Start of Care Date:  06/22/22   Onset Date:   01/22/22   Type:     _X__PT   ___OT Medical Diagnosis:    Encounter Diagnoses   Name Primary?    Arthritis     Cervical radiculopathy     Neck pain     Right-sided thoracic back pain, unspecified chronicity         Treatment Diagnosis:  neck/R UE pain secondary to derangement        Goals:     06/22/22 0500   Body Part   Goals listed below are for neck/thoracic pain   Goal #1   Goal #1 sitting   Previous Functional Level No restrictions   Current Functional Level Minutes patient can sit   Performance level 10-15 with up to 10/10 concordant thoracic/rib pain   STG Target Performance Minutes patient will be able to sit   Performance level 10-15 with up to 5/10 concordant thoraci /rib pain   Rationale to allow rest from standing;for personal hygiene;for community transportation   Due date 07/13/22   LTG Target Performance Minutes patient will be able to sit   Performance Level 30 with 0-2/10 concordant thoracic/rib pain   Rationale for personal hygiene;to allow rest from standing   Due date 08/03/22   Goal #2   Goal #2 sleeping   Previous Functional Level No restrictions   Current Functional Level 2-3 hours without sleep per night   Performance Level 7/10 concordant neck pain   STG Target Performance 1-2 hours without sleep per night   Performance Level 3/10 concordant neck pain   Rationale to establish restorative sleep pattern   Due Date 07/13/22   LTG Target Performance Sleep through the night w/o meds   Rationale to establish restorative  sleep pattern   Due Date 08/03/22       Therapy Frequency:  1x/week  Predicted Duration of Therapy Intervention:  6 weeks    Fiordaliza Brice, PT                 I CERTIFY THE NEED FOR THESE SERVICES FURNISHED UNDER        THIS PLAN OF TREATMENT AND WHILE UNDER MY CARE     (Physician attestation of this document indicates review and certification of the therapy plan).                     Certification Date From:  06/22/22   Certification Date To:  09/19/22    Referring Provider:  Abdon Strauss    Initial Assessment        See Epic Evaluation SOC Date: 06/22/22

## 2022-06-25 ENCOUNTER — HEALTH MAINTENANCE LETTER (OUTPATIENT)
Age: 47
End: 2022-06-25

## 2022-07-06 ENCOUNTER — THERAPY VISIT (OUTPATIENT)
Dept: PHYSICAL THERAPY | Facility: CLINIC | Age: 47
End: 2022-07-06
Payer: COMMERCIAL

## 2022-07-06 DIAGNOSIS — M54.6 ACUTE RIGHT-SIDED THORACIC BACK PAIN: ICD-10-CM

## 2022-07-06 DIAGNOSIS — M54.2 NECK PAIN: Primary | ICD-10-CM

## 2022-07-06 PROCEDURE — 97112 NEUROMUSCULAR REEDUCATION: CPT | Mod: GP | Performed by: PHYSICAL THERAPIST

## 2022-07-06 PROCEDURE — 97110 THERAPEUTIC EXERCISES: CPT | Mod: GP | Performed by: PHYSICAL THERAPIST

## 2023-05-01 NOTE — PROGRESS NOTES
Isabel Mathias was seen 2 times for evaluation and treatment.  Patient did not return for further treatment and current status is unknown.  Due to short treatment duration, no objective or functional changes were made.  Please see goal flow sheet from episode noted date below and initial evaluation for further information.    Linked Episodes   Type: Episode: Status: Noted: Resolved: Last update: Updated by:   PHYSICAL THERAPY [id number] Active 6/22/2022 7/6/2022  3:56 PM Fiordaliza Brice PT      Comments:

## 2023-07-02 ENCOUNTER — HEALTH MAINTENANCE LETTER (OUTPATIENT)
Age: 48
End: 2023-07-02

## 2023-09-07 ENCOUNTER — NURSE TRIAGE (OUTPATIENT)
Dept: NURSING | Facility: CLINIC | Age: 48
End: 2023-09-07

## 2023-09-07 NOTE — TELEPHONE ENCOUNTER
Nurse Triage SBAR    Situation:   -Missed travel clinic appointment    Background:   -Patient calling, It is okay to leave a detailed message at this number.   -declines interpretor  -gave verbal consent to communicate with daughter    Assessment:   -had apt a travel clinic, but missed appointment  -leaving Saturday to [facility]  -wondering what vaccines are needed vs. recommended    Recommendation:   -go to the [facility] travel website  -see if any other, non-[facility], travel clinics have appointments  -warm transferred to central scheduling to make an appointment, they are wanting to see any available provider - they know they may not have access to the need travel vaccines, but want to see if getting flue vaccine and malaria pills might be a option    ASHLEY HAMILTON, RN on 9/7/2023 at 6:17 PM

## 2023-09-08 ENCOUNTER — TELEPHONE (OUTPATIENT)
Dept: FAMILY MEDICINE | Facility: CLINIC | Age: 48
End: 2023-09-08

## 2023-09-08 NOTE — TELEPHONE ENCOUNTER
Spoke with patient to relay provider recommendations. Patient verbalized understanding and did not have any additional questions or concerns at this time.

## 2023-09-08 NOTE — TELEPHONE ENCOUNTER
I met pt once in  May 2022. Had appt with other  travel clinic yesterday but no show and also had appt with other clinic today and no show. I don't feel prescribing anti-malaria for pt that I have no info re: current medical status, travel plan, etc  and will not prescribe. Suggest pt be seen in an urgent care today to  further evaluaiton

## 2023-09-08 NOTE — TELEPHONE ENCOUNTER
"New Medication Request    What medication are you requesting?: malaria pills    Reason for medication request: Leaving 9/9/23, returning in a few weeks if any appts are needed.     Had \"travel clinic\" appt yesterday (9/7/23) but they did not prescribe malaria pills.  Pt was told to call PCP for urgent Rx.     Have you taken this medication before?: No    Controlled Substance Agreement on file:   CSA -- Patient Level:    CSA: None found at the patient level.       Patient offered an appointment? No    [facility]:    [facility]   [place]: [address]  Phone  [phone]  Fax [phone]    Could we send this information to you in [facility] or would you prefer to receive a phone call?:     Patient would prefer a phone call     Okay to leave a detailed message?: Yes at Cell number on file:    Telephone Information:   Mobile [phone]     "

## 2023-09-10 ENCOUNTER — HEALTH MAINTENANCE LETTER (OUTPATIENT)
Age: 48
End: 2023-09-10

## 2024-05-06 ENCOUNTER — ORDER TRANSCRIPTION (OUTPATIENT)
Dept: PHYSICAL THERAPY | Facility: HOSPITAL | Age: 49
End: 2024-05-06

## 2024-05-06 ENCOUNTER — TELEPHONE (OUTPATIENT)
Dept: PHYSICAL THERAPY | Facility: HOSPITAL | Age: 49
End: 2024-05-06

## 2024-05-06 DIAGNOSIS — M25.512 LEFT SHOULDER PAIN: Primary | ICD-10-CM

## 2024-08-25 ENCOUNTER — HEALTH MAINTENANCE LETTER (OUTPATIENT)
Age: 49
End: 2024-08-25

## 2024-11-03 ENCOUNTER — HEALTH MAINTENANCE LETTER (OUTPATIENT)
Age: 49
End: 2024-11-03

## 2025-06-25 ENCOUNTER — OFFICE VISIT (OUTPATIENT)
Dept: FAMILY MEDICINE | Facility: CLINIC | Age: 50
End: 2025-06-25
Payer: COMMERCIAL

## 2025-06-25 VITALS
HEIGHT: 62 IN | DIASTOLIC BLOOD PRESSURE: 70 MMHG | WEIGHT: 179 LBS | SYSTOLIC BLOOD PRESSURE: 103 MMHG | RESPIRATION RATE: 12 BRPM | HEART RATE: 67 BPM | TEMPERATURE: 97.8 F | BODY MASS INDEX: 32.94 KG/M2 | OXYGEN SATURATION: 98 %

## 2025-06-25 DIAGNOSIS — Z23 NEED FOR POLIO VACCINATION: ICD-10-CM

## 2025-06-25 DIAGNOSIS — Z23 NEED FOR PNEUMOCOCCAL 20-VALENT CONJUGATE VACCINATION: ICD-10-CM

## 2025-06-25 DIAGNOSIS — Z71.84 ENCOUNTER FOR COUNSELING FOR TRAVEL: Primary | ICD-10-CM

## 2025-06-25 DIAGNOSIS — Z23 NEED FOR HEPATITIS A IMMUNIZATION: ICD-10-CM

## 2025-06-25 DIAGNOSIS — Z23 NEED FOR IMMUNIZATION AGAINST TYPHOID: ICD-10-CM

## 2025-06-25 DIAGNOSIS — Z23 NEED FOR MENINGOCOCCAL VACCINATION: ICD-10-CM

## 2025-06-25 PROCEDURE — 99402 PREV MED CNSL INDIV APPRX 30: CPT | Mod: 25 | Performed by: PHYSICIAN ASSISTANT

## 2025-06-25 PROCEDURE — 90619 MENACWY-TT VACCINE IM: CPT | Performed by: PHYSICIAN ASSISTANT

## 2025-06-25 PROCEDURE — 90713 POLIOVIRUS IPV SC/IM: CPT | Performed by: PHYSICIAN ASSISTANT

## 2025-06-25 PROCEDURE — 3078F DIAST BP <80 MM HG: CPT | Performed by: PHYSICIAN ASSISTANT

## 2025-06-25 PROCEDURE — G0009 ADMIN PNEUMOCOCCAL VACCINE: HCPCS | Performed by: PHYSICIAN ASSISTANT

## 2025-06-25 PROCEDURE — 90472 IMMUNIZATION ADMIN EACH ADD: CPT | Performed by: PHYSICIAN ASSISTANT

## 2025-06-25 PROCEDURE — 3074F SYST BP LT 130 MM HG: CPT | Performed by: PHYSICIAN ASSISTANT

## 2025-06-25 PROCEDURE — 90677 PCV20 VACCINE IM: CPT | Performed by: PHYSICIAN ASSISTANT

## 2025-06-25 PROCEDURE — 90691 TYPHOID VACCINE IM: CPT | Performed by: PHYSICIAN ASSISTANT

## 2025-06-25 PROCEDURE — 90632 HEPA VACCINE ADULT IM: CPT | Performed by: PHYSICIAN ASSISTANT

## 2025-06-25 RX ORDER — ONDANSETRON 4 MG/1
4 TABLET, ORALLY DISINTEGRATING ORAL EVERY 6 HOURS PRN
Qty: 15 TABLET | Refills: 0 | Status: SHIPPED | OUTPATIENT
Start: 2025-06-25

## 2025-06-25 RX ORDER — AZITHROMYCIN 500 MG/1
TABLET, FILM COATED ORAL
Qty: 3 TABLET | Refills: 0 | Status: SHIPPED | OUTPATIENT
Start: 2025-06-25

## 2025-06-25 NOTE — PROGRESS NOTES
SUBJECTIVE: Isabel Mathias , a 50 year old  female, presents for counseling and information regarding upcoming travel to [place]. Special medical concerns include: none. She anticipates the following unusual exposures: none.    Itinerary:  [place]     Departure Date: 7/10/25 Return date: end of July    Reason for travel (i.e. Business, pleasure): pleasure (Spiritism)- [place]    Visiting an urban or rural area?: Rural    Accommodations (i.e. hotel, hostel, friends, family, etc): hotel    Women - First day of your last period: n/a    IMMUNIZATION HISTORY  Have you received any vaccinations in the past 4 weeks? If so, which? No  Have you ever fainted from having your blood drawn or from an injection?  No  Have you ever had any bad reaction or side effect from any vaccination?  If so, which? No  Do you live (or work closely) with anyone who has AIDS, an AIDS-like condition, any other immune disorder or who is on chemotherapy for cancer?  No  Have you received any injection of immune globulin or any blood products during the past 12 months?  No    GENERAL MEDICAL HISTORY  Do you have a medical condition that requires medicine or doctor follow-up visits?  No  Do you have a medical condition that is stable now, but that may recur while traveling?  No  Has your spleen been removed?  No  Have you had an illness or a fever in the past 48 hours?  No  Are you pregnant, or might you become pregnant on this trip?  Any chance of pregnancy?  No  Are you breastfeeding?  No  Do you have HIV, AIDS, an AIDS-like condition, any other immune disorder, leukemia or cancer?  No  Have you had your thymus gland removed or history of problems with your thymus, such as myasthenia gravis, DiGeorge syndrome, or thymoma?  No  Do you have a severely low platelet count (thrombocytopenia) or a blood clotting disorder?  No  Have you ever had a convulsion, seizure, epilepsy, neurologic condition or brain infection?  No  Do you have any stomach  conditions?  No  Do you have severe renal or kidney impairment?  No  Do you have a history of mental health concerns?  No  Do you get yeast infections often?  No  Do you have psoriasis?  No  Do you get motion sickness?  No  Have you ever had headaches, nausea, vomiting, or breathing problems from altitude exposure?  No    MEDICINES  Are you taking:   Steroids, prednisone, anti-cancer drugs, or medicines that suppress your immune system? No  Antibiotics or sulfonamides? No  Oral contraceptives (birth control pills)? No  Aspirin therapy (children and teens)? No    ALLERGIES  Are you allergic to:  Any medicines? No  Any foods or other? No  Neomycin, formalin, or fish products? No      No past medical history on file.     There is no immunization history on file for this patient.    No current outpatient medications on file.     Not on File     EXAM: deferred    Immunizations discussed include: Hepatitis A, Typhoid, Meningococcus, Polio, and pneumonia  Malaria prophylaxis recommended: not needed  Symptomatic treatment for traveler's diarrhea: bismuth subsalicylate, loperamide, and azithromycin    ASSESSMENT/PLAN:    (Z71.84) Encounter for counseling for travel  (primary encounter diagnosis)    Comment: Typhoid, meningococcal, polio, pneumonia, and hepatitis A vaccines today. Patient will return or follow-up with PCP as needed. Prophylaxis given for Traveler's diarrhea and is not needed for Malaria. All questions were answered. Patient was informed that vaccines may not be covered and should check with insurance company to be sure. They have decided to proceed with vaccines ordered today.    Plan: azithromycin (ZITHROMAX) 500 MG tablet,         ondansetron (ZOFRAN ODT) 4 MG ODT tab            (Z23) Need for immunization against typhoid  Comment:   Plan: TYPHOID VACCINE, IM            (Z23) Need for meningococcal vaccination  Comment:   Plan: MENINGOCOCCAL ACWY >2Y (MENQUADFI )            (Z23) Need for polio  vaccination  Comment:   Plan: POLIOVIRUS 6W-18Y (IPOL)            (Z23) Need for pneumococcal 20-valent conjugate vaccination  Comment:   Plan: PNEUMOCOCCAL 20 VALENT CONJUGATE (PREVNAR 20)            (Z23) Need for hepatitis A immunization  Comment:   Plan: HEPATITIS A 19+ (HAVRIX/VAQTA)              I have reviewed general recommendations for safe travel   including: food/water precautions, insect avoidance, safe sex   practices given high prevalence of HIV and other STDs,   roadway safety. Educational materials and links to the [facility]   Traveler's health website have been provided.    Total time 25 minutes, greater than 50 percent in counseling   and coordination of care.